# Patient Record
Sex: MALE | Race: WHITE | NOT HISPANIC OR LATINO | Employment: FULL TIME | URBAN - METROPOLITAN AREA
[De-identification: names, ages, dates, MRNs, and addresses within clinical notes are randomized per-mention and may not be internally consistent; named-entity substitution may affect disease eponyms.]

---

## 2017-01-11 ENCOUNTER — ALLSCRIPTS OFFICE VISIT (OUTPATIENT)
Dept: OTHER | Facility: OTHER | Age: 42
End: 2017-01-11

## 2017-01-11 DIAGNOSIS — R51.9 HEADACHE: ICD-10-CM

## 2017-01-11 DIAGNOSIS — M54.2 CERVICALGIA: ICD-10-CM

## 2017-01-11 DIAGNOSIS — I25.10 ATHEROSCLEROTIC HEART DISEASE OF NATIVE CORONARY ARTERY WITHOUT ANGINA PECTORIS: ICD-10-CM

## 2017-01-11 DIAGNOSIS — R68.89 OTHER GENERAL SYMPTOMS AND SIGNS: ICD-10-CM

## 2017-01-20 ENCOUNTER — HOSPITAL ENCOUNTER (OUTPATIENT)
Dept: NON INVASIVE DIAGNOSTICS | Facility: CLINIC | Age: 42
Discharge: HOME | End: 2017-01-20
Payer: COMMERCIAL

## 2017-01-20 DIAGNOSIS — R68.89 OTHER GENERAL SYMPTOMS AND SIGNS: ICD-10-CM

## 2017-01-20 DIAGNOSIS — I25.10 ATHEROSCLEROTIC HEART DISEASE OF NATIVE CORONARY ARTERY WITHOUT ANGINA PECTORIS: ICD-10-CM

## 2017-01-20 DIAGNOSIS — R51.9 HEADACHE: ICD-10-CM

## 2017-01-20 DIAGNOSIS — M54.2 CERVICALGIA: ICD-10-CM

## 2017-01-20 PROCEDURE — 93880 EXTRACRANIAL BILAT STUDY: CPT

## 2017-01-23 ENCOUNTER — GENERIC CONVERSION - ENCOUNTER (OUTPATIENT)
Dept: OTHER | Facility: OTHER | Age: 42
End: 2017-01-23

## 2017-03-02 ENCOUNTER — ALLSCRIPTS OFFICE VISIT (OUTPATIENT)
Dept: OTHER | Facility: OTHER | Age: 42
End: 2017-03-02

## 2017-07-11 ENCOUNTER — ALLSCRIPTS OFFICE VISIT (OUTPATIENT)
Dept: OTHER | Facility: OTHER | Age: 42
End: 2017-07-11

## 2017-07-20 LAB
A/G RATIO (HISTORICAL): 2.2 (CALC) (ref 1–2.5)
ALBUMIN SERPL BCP-MCNC: 4.8 G/DL (ref 3.6–5.1)
ALP SERPL-CCNC: 65 U/L (ref 40–115)
ALT SERPL W P-5'-P-CCNC: 33 U/L (ref 9–46)
AST SERPL W P-5'-P-CCNC: 27 U/L (ref 10–40)
BILIRUB SERPL-MCNC: 0.9 MG/DL (ref 0.2–1.2)
BUN SERPL-MCNC: 17 MG/DL (ref 7–25)
BUN/CREA RATIO (HISTORICAL): NORMAL (CALC) (ref 6–22)
CALCIUM SERPL-MCNC: 10.1 MG/DL (ref 8.6–10.3)
CHLORIDE SERPL-SCNC: 102 MMOL/L (ref 98–110)
CHOLEST SERPL-MCNC: 209 MG/DL (ref 125–200)
CHOLEST/HDLC SERPL: 3.7 (CALC)
CO2 SERPL-SCNC: 27 MMOL/L (ref 20–31)
CREAT SERPL-MCNC: 0.98 MG/DL (ref 0.6–1.35)
EGFR AFRICAN AMERICAN (HISTORICAL): 112 ML/MIN/1.73M2
EGFR-AMERICAN CALC (HISTORICAL): 97 ML/MIN/1.73M2
GAMMA GLOBULIN (HISTORICAL): 2.2 G/DL (CALC) (ref 1.9–3.7)
GLUCOSE (HISTORICAL): 94 MG/DL (ref 65–99)
HDLC SERPL-MCNC: 56 MG/DL
LDL CHOLESTEROL (HISTORICAL): 135 MG/DL (CALC)
NON-HDL-CHOL (CHOL-HDL) (HISTORICAL): 153 MG/DL (CALC)
POTASSIUM SERPL-SCNC: 5 MMOL/L (ref 3.5–5.3)
SODIUM SERPL-SCNC: 137 MMOL/L (ref 135–146)
TOTAL PROTEIN (HISTORICAL): 7 G/DL (ref 6.1–8.1)
TRIGL SERPL-MCNC: 89 MG/DL

## 2017-07-31 ENCOUNTER — GENERIC CONVERSION - ENCOUNTER (OUTPATIENT)
Dept: OTHER | Facility: OTHER | Age: 42
End: 2017-07-31

## 2018-01-09 ENCOUNTER — ALLSCRIPTS OFFICE VISIT (OUTPATIENT)
Dept: OTHER | Facility: OTHER | Age: 43
End: 2018-01-09

## 2018-01-10 NOTE — PROGRESS NOTES
Assessment   1  Benign essential hypertension (401 1) (I10)   2  Pruritus (698 9) (L29 9)    Plan   Health Maintenance    · Follow-up visit in 6 months Evaluation and Treatment  Follow-up  Status: Hold For -    Scheduling  Requested for: [id number]  Pruritus    · Triamcinolone Acetonide 0 5 % External Cream; APPLY 2-3 TIMES DAILY TO    AFFECTED AREA(S)    Discussion/Summary      Hypertension appears to be well controlled on lisinopril 10 mg daily--continue current treatment  Patient complains of pruritus, unknown cause--recommend empiric treatment with high potency triamcinolone cream  Encouraged patient to follow up with Dermatology if this does not help  If Dermatology does not feel this is a dermatologic matter, will refer to PM&R for nerve conduction study  Patient verbalizes understanding and agrees to plan of care  RTO 6 months for complete physical exam and blood work  The patient was counseled regarding diagnostic results,-- instructions for management,-- risk factor reductions,-- prognosis,-- patient and family education,-- impressions,-- risks and benefits of treatment options,-- importance of compliance with treatment  Possible side effects of new medications were reviewed with the patient/guardian today  The treatment plan was reviewed with the patient/guardian   The patient/guardian understands and agrees with the treatment plan      Chief Complaint   Pt here for 6 months f/u      History of Present Illness   Patient presents for routine blood pressure follow-up reports itching of left upper extremity has been occurring off and on for approximately 5 years, worse the last several weeks that he is typically gotten the symptoms every fall in the resolve rather quickly year, his symptoms began in the fall and are lingering a generalized, intermittent itch affecting the left upper extremity associated with visible hives recommended over-the-counter anti-itch cream that did not help, he is concerned that he may be suffering from a condition called âbrachioradial pruritusâ    The patient presents for follow-up of essential hypertension  The patient states he has been doing well with his blood pressure control since the last visit  He has no significant interval events  Symptoms: denies impaired vision,-- denies dyspnea,-- denies chest pain,-- denies intermittent leg claudication-- and-- denies lower extremity edema  Associated symptoms include no headache,-- no focal neurologic deficits-- and-- no memory loss  Home monitoring: The patient checks his blood pressure sporadically  Blood pressure control has been good  Medications: the patient is adherent with his medication regimen  -- He denies medication side effects  Additional History: Believes he has gotten lax with his diet  Review of Systems        Constitutional: No fever or chills, feels well, no tiredness, no recent weight gain or weight loss  Eyes: No complaints of eye pain, no red eyes, no discharge from eyes, no itchy eyes  ENT: no complaints of earache, no hearing loss, no nosebleeds, no nasal discharge, no sore throat, no hoarseness  Cardiovascular: No complaints of slow heart rate, no fast heart rate, no chest pain, no palpitations, no leg claudication, no lower extremity  Respiratory: No complaints of shortness of breath, no wheezing, no cough, no SOB on exertion, no orthopnea or PND  Gastrointestinal: No complaints of abdominal pain, no constipation, no nausea or vomiting, no diarrhea or bloody stools  Genitourinary: No complaints of dysuria, no incontinence, no hesitancy, no nocturia, no genital lesion, no testicular pain  Musculoskeletal: No complaints of arthralgia, no myalgias, no joint swelling or stiffness, no limb pain or swelling  Integumentary: as noted in HPI        Neurological: No compliants of headache, no confusion, no convulsions, no numbness or tingling, no dizziness or fainting, no limb weakness, no difficulty walking  Psychiatric: Is not suicidal, no sleep disturbances, no anxiety or depression, no change in personality, no emotional problems  Endocrine: No complaints of proptosis, no hot flashes, no muscle weakness, no erectile dysfunction, no deepening of the voice, no feelings of weakness  Hematologic/Lymphatic: No complaints of swollen glands, no swollen glands in the neck, does not bleed easily, no easy bruising  Active Problems   1  Benign essential hypertension (401 1) (I10)   2  Generalized headaches (784 0) (R51)   3  Other cardiovascular disease (429 2) (I25 10)   4  Prolapsing Mitral Valve Leaflet Syndrome (424 0)    Past Medical History   1  History of Anxiety (300 00) (F41 9)   2  History of Colon, diverticulosis (562 10) (K57 30)   3  History of Epidermal inclusion cyst (706 2) (L72 0)   4  History of fatigue (V13 89) (Z87 898)   5  History of onychomycosis (V12 09) (Z86 19)   6  History of vertigo (V12 49) (Z87 898)   7  History of Lyme disease (088 81) (A69 20)   8  History of Non-toxic multinodular goiter (241 1) (E04 2)   9  History of Rhinitis Medicamentosa (472 0)     The active problems and past medical history were reviewed and updated today  Surgical History   1  History of Colonoscopy (Fiberoptic)   2  History of Percutaneous Fixation Of Fracture Of One Metacarpal Bone   3  History of Septoplasty     The surgical history was reviewed and updated today  Family History   Mother    1  No pertinent family history  Father    2  No pertinent family history     The family history was reviewed and updated today  Social History    · Denied: History of Alcohol Use (History)   · Educational Level - Completed Associate's Degree   · Former smoker (V15 82) (E31 173)   · Marital History - Currently    · Occupation:   · Social alcohol use (Z78 9)  The social history was reviewed and updated today   The social history was reviewed and is unchanged  Current Meds    1  Aleve TABS; Therapy: (Recorded:11Jan2017) to Recorded   2  Fluorouracil (Topical) 2 % SOLN;     Therapy: (Recorded:09Jan2018) to Recorded   3  Lisinopril 10 MG Oral Tablet; take 1 tablet by mouth daily; Therapy: 46YCS5790 to (Jayda Holbrook)  Requested for: 32QJD9409; Last     Rx:30Nov2017 Ordered   4  Multiple Vitamins Oral Tablet; TAKE 1 TABLET DAILY; Therapy: 38KDA1138 to (Evaluate:62Xtb9806) Recorded     The medication list was reviewed and updated today  Allergies   1  Penicillins    Vitals   Vital Signs    Recorded: 49AHZ7245 05:39PM   Temperature 97 5 F, Tympanic   Heart Rate 59   Systolic 975   Diastolic 74   Weight 268 lb 3 oz   BMI Calculated 26 28   BSA Calculated 2 15   O2 Saturation 98     Physical Exam        Constitutional      General appearance: No acute distress, well appearing and well nourished  Eyes      Conjunctiva and lids: No swelling, erythema, or discharge  Ears, Nose, Mouth, and Throat      External inspection of ears and nose: Normal        Oropharynx: Normal with no erythema, edema, exudate or lesions  Pulmonary      Respiratory effort: No increased work of breathing or signs of respiratory distress  Auscultation of lungs: Clear to auscultation, equal breath sounds bilaterally, no wheezes, no rales, no rhonci  Cardiovascular      Auscultation of heart: Normal rate and rhythm, normal S1 and S2, without murmurs  Examination of extremities for edema and/or varicosities: Normal        Abdomen      Abdomen: Non-tender, no masses  Lymphatic      Palpation of lymph nodes in neck: No lymphadenopathy  Musculoskeletal      Gait and station: Normal        Skin      Skin and subcutaneous tissue: Normal without rashes or lesions  -- No urticaria, erythema, or excoriations appreciated on exam today  Neurologic      Cranial nerves: Cranial nerves 2-12 intact         Psychiatric Orientation to person, place and time: Normal        Mood and affect: Normal           Future Appointments      Date/Time Provider Specialty Site   07/10/2018 04:00 PM Angie Chong DO Family Medicine [facility]     Signatures    Electronically signed by : Vivian Woody DO; Jan 9 2018  6:15PM EST                       (Author)

## 2018-01-12 VITALS
WEIGHT: 196.44 LBS | DIASTOLIC BLOOD PRESSURE: 80 MMHG | BODY MASS INDEX: 26.03 KG/M2 | OXYGEN SATURATION: 98 % | SYSTOLIC BLOOD PRESSURE: 128 MMHG | HEART RATE: 68 BPM | HEIGHT: 73 IN | TEMPERATURE: 98.2 F

## 2018-01-12 VITALS
SYSTOLIC BLOOD PRESSURE: 134 MMHG | BODY MASS INDEX: 28.04 KG/M2 | HEART RATE: 67 BPM | HEIGHT: 72 IN | TEMPERATURE: 97.8 F | OXYGEN SATURATION: 99 % | RESPIRATION RATE: 18 BRPM | DIASTOLIC BLOOD PRESSURE: 84 MMHG | WEIGHT: 207 LBS

## 2018-01-12 NOTE — PROGRESS NOTES
Assessment    1  Encounter for preventive health examination (V70 0) (Z00 00)   2  Benign essential hypertension (401 1) (I10)   3  Screening for lipid disorders (V77 91) (Z13 220)   4  Screening for diabetes mellitus (V77 1) (Z13 1)    Plan  Benign essential hypertension, Health Maintenance, Screening for diabetes mellitus,  Screening for lipid disorders    · (1) COMPREHENSIVE METABOLIC PANEL; Status:Active; Requested for:71Bxc0635;    · (1) LIPID PANEL, FASTING; Status:Active; Requested for:23Tuw6052;     Discussion/Summary  Impression: health maintenance visit, healthy adult male  Currently, he eats an adequate diet and has an adequate exercise regimen  Prostate cancer screening: PSA is not indicated  Testicular cancer screening: the risks and benefits of testicular cancer screening were discussed and monthly self testicular exam was advised  Colorectal cancer screening: colorectal cancer screening is not indicated  Screening lab work includes glucose and lipid profile  The immunizations are up to date  He was advised to be evaluated by an optometrist and a dentist  Advice and education were given regarding nutrition, aerobic exercise, weight bearing exercise, weight loss, calcium supplements, vitamin D supplements, reproductive health, cardiovascular risk reduction, sunscreen use, self skin examination and seat belt use  Patient discussion: discussed with the patient  The patient was counseled regarding diagnostic results, instructions for management, risk factor reductions, prognosis, patient and family education, impressions, risks and benefits of treatment options, importance of compliance with treatment  Possible side effects of new medications were reviewed with the patient/guardian today  The treatment plan was reviewed with the patient/guardian   The patient/guardian understands and agrees with the treatment plan      Chief Complaint  Pt here for annual physical exam      History of Present Illness  HM, Adult Male: The patient is being seen for a health maintenance evaluation  The last health maintenance visit was 2 year(s) ago  Social History: Household members include spouse and 1 son(s)  He is   Work status: working full time  The patient is a former cigarette smoker and quit smoking 2009  He has 15 pack year(s) of cigarette use  He reports occasional alcohol use and drinking 2-3 drinks per week  He has never used illicit drugs  General Health: The patient's health since the last visit is described as good  He has regular dental visits  He complains of vision problems  Vision care includes wearing glasses (night driving) and having regular eye examinations  He denies hearing loss  Immunizations status: up to date  Lifestyle:  He consumes a diverse and healthy diet  (healthy, diverse -- protein from lean meat/fish -- working on portion control )   He exercises regularly  He exercises less than three times a week for 30 or more minutes per session  Exercise includes walking and running/jogging  Reproductive health:  the patient is sexually active  birth control is being practiced (partner's menstrual timing methods and natural family planning )   He denies erectile dysfunction  Screening: cancer screening reviewed and current  Prostate cancer screening includes no previous prostate-specific antigen testing  Testicular cancer screening includes irregular self testicular examinations  Colorectal cancer screening includes no previous screening  metabolic screening reviewed and current  risk screening reviewed and current  Cardiovascular risk factors: hypertension, but no diabetes  Safety elements used: seat belt, safe driving habits and sunscreen  Review of Systems    Constitutional: No fever or chills, feels well, no tiredness, no recent weight gain or weight loss  Eyes: No complaints of eye pain, no red eyes, no discharge from eyes, no itchy eyes     ENT: no complaints of earache, no hearing loss, no nosebleeds, no nasal discharge, no sore throat, no hoarseness  Cardiovascular: No complaints of slow heart rate, no fast heart rate, no chest pain, no palpitations, no leg claudication, no lower extremity  Respiratory: No complaints of shortness of breath, no wheezing, no cough, no SOB on exertion, no orthopnea or PND  Gastrointestinal: No complaints of abdominal pain, no constipation, no nausea or vomiting, no diarrhea or bloody stools  Genitourinary: No complaints of dysuria, no incontinence, no hesitancy, no nocturia, no genital lesion, no testicular pain  Musculoskeletal: No complaints of arthralgia, no myalgias, no joint swelling or stiffness, no limb pain or swelling  Integumentary: No complaints of skin rash or skin lesions, no itching, no skin wound, no dry skin  Neurological: No compliants of headache, no confusion, no convulsions, no numbness or tingling, no dizziness or fainting, no limb weakness, no difficulty walking  Psychiatric: Is not suicidal, no sleep disturbances, no anxiety or depression, no change in personality, no emotional problems  Endocrine: No complaints of proptosis, no hot flashes, no muscle weakness, no erectile dysfunction, no deepening of the voice, no feelings of weakness  Hematologic/Lymphatic: No complaints of swollen glands, no swollen glands in the neck, does not bleed easily, no easy bruising  Active Problems    1  Benign essential hypertension (401 1) (I10)   2  Colon, diverticulosis (562 10) (K57 30)   3  Depression screen (V79 0) (Z13 89)   4  Discomfort of neck (723 1) (M54 2)   5  Fatigue (780 79) (R53 83)   6  Generalized headaches (784 0) (R51)   7  Laceration of thumb, left (883 0) (S61 012A)   8  Other cardiovascular disease (429 2) (I25 10)   9  Prolapsing Mitral Valve Leaflet Syndrome (424 0)   10   Throat tightness (784 99) (R68 89)    Past Medical History    · History of Anxiety (300 00) (F41 9)   · History of Epidermal inclusion cyst (706 2) (L72 0)   · History of onychomycosis (V12 09) (Z86 19)   · History of vertigo (V12 49) (Z87 898)   · Laceration of thumb, left (883 0) (S61 012A)   · History of Lyme disease (088 81) (A69 20)   · History of Non-toxic multinodular goiter (241 1) (E04 2)   · History of Rhinitis Medicamentosa (472 0)    Surgical History    · History of Colonoscopy (Fiberoptic)   · History of Percutaneous Fixation Of Fracture Of One Metacarpal Bone   · History of Septoplasty    Family History  Mother    · No pertinent family history  Father    · No pertinent family history    Social History    · Denied: History of Alcohol Use (History)   · Educational Level - Completed Associate's Degree   · Former smoker (V15 82) (Z87 891)   · 1 ppd x 9 years; quit 2008   · Marital History - Currently    · 1 son   · 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  · Social alcohol use (Z78 9)    Current Meds   1  Aleve TABS; Therapy: (Recorded:11Jan2017) to Recorded   2  Lisinopril 10 MG Oral Tablet; Take 1 tablet daily; Therapy: 94DRC4832 to (Evaluate:35Ror5642)  Requested for: 73VPE7364; Last   Rx:07Jun2017; Status: ACTIVE - Renewal Denied Ordered   3  Multiple Vitamins Oral Tablet; TAKE 1 TABLET DAILY; Therapy: 62MRX2916 to (Evaluate:42Waz1992) Recorded    Allergies    1  Penicillins    Vitals   Recorded: 03HWJ0960 04:43PM   Temperature 98 2 F, Temporal   Heart Rate 68   Systolic 258   Diastolic 80   Height 6 ft 1 in   Weight 196 lb 7 oz   BMI Calculated 25 92   BSA Calculated 2 14   O2 Saturation 98     Physical Exam    Constitutional   General appearance: No acute distress, well appearing and well nourished  Head and Face   Head and face: Normal     Palpation of the face and sinuses: No sinus tenderness  Eyes   Conjunctiva and lids: No erythema, swelling or discharge  Pupils and irises: Equal, round, reactive to light      Ears, Nose, Mouth, and Throat   External inspection of ears and nose: Normal     Otoscopic examination: Tympanic membranes translucent with normal light reflex  Canals patent without erythema  Hearing: Normal     Nasal mucosa, septum, and turbinates: Normal without edema or erythema  Lips, teeth, and gums: Normal, good dentition  Oropharynx: Normal with no erythema, edema, exudate or lesions  Neck   Neck: Supple, symmetric, trachea midline, no masses  Thyroid: Normal, no thyromegaly  Pulmonary   Respiratory effort: No increased work of breathing or signs of respiratory distress  Auscultation of lungs: Clear to auscultation  Cardiovascular   Auscultation of heart: Normal rate and rhythm, normal S1 and S2, no murmurs  Examination of extremities for edema and/or varicosities: Normal     Abdomen   Abdomen: Non-tender, no masses  Liver and spleen: No hepatomegaly or splenomegaly  Lymphatic   Palpation of lymph nodes in neck: No lymphadenopathy  Musculoskeletal   Gait and station: Normal     Inspection/palpation of digits and nails: Normal without clubbing or cyanosis  Inspection/palpation of joints, bones, and muscles: Normal     Range of motion: Normal     Stability: Normal     Muscle strength/tone: Normal     Skin   Skin and subcutaneous tissue: Normal without rashes or lesions  Palpation of skin and subcutaneous tissue: Normal turgor  Neurologic   Cranial nerves: Cranial nerves 2-12 intact  Reflexes: 2+ and symmetric  Sensation: No sensory loss      Psychiatric   Orientation to person, place and time: Normal     Mood and affect: Normal        Future Appointments    Date/Time Provider Specialty Site   01/09/2018 05:45 PM Symone Mari DO Family Medicine [facility]     Signatures   Electronically signed by : Edith Gallego DO; Jul 11 2017  5:16PM EST                       (Author)

## 2018-01-13 NOTE — RESULT NOTES
Verified Results  (1) COMPREHENSIVE METABOLIC PANEL [id number] 83:04ZY Clare Barrera   REPORT COMMENT:  FASTING:YES     Test Name Result Flag Reference   GLUCOSE 94 mg/dL  65-99   Fasting reference interval   UREA NITROGEN (BUN) 17 mg/dL  7-25   CREATININE 0 98 mg/dL  0 60-1 35   eGFR NON-AFR  AMERICAN 97 mL/min/1 73m2  > OR = 60   eGFR AFRICAN AMERICAN 112 mL/min/1 73m2  > OR = 60   BUN/CREATININE RATIO   3-45   NOT APPLICABLE (calc)   SODIUM 137 mmol/L  135-146   POTASSIUM 5 0 mmol/L  3 5-5 3   CHLORIDE 102 mmol/L     CARBON DIOXIDE 27 mmol/L  20-31   CALCIUM 10 1 mg/dL  8 6-10 3   PROTEIN, TOTAL 7 0 g/dL  6 1-8 1   ALBUMIN 4 8 g/dL  3 6-5 1   GLOBULIN 2 2 g/dL (calc)  1 9-3 7   ALBUMIN/GLOBULIN RATIO 2 2 (calc)  1 0-2 5   BILIRUBIN, TOTAL 0 9 mg/dL  0 2-1 2   ALKALINE PHOSPHATASE 65 U/L     AST 27 U/L  10-40   ALT 33 U/L  9-46     (1) LIPID PANEL, FASTING [id number] 02:43PM Clare Barrera     Test Name Result Flag Reference   CHOLESTEROL, TOTAL 209 mg/dL H 125-200   HDL CHOLESTEROL 56 mg/dL  > OR = 40   TRIGLICERIDES 89 mg/dL  <421   LDL-CHOLESTEROL 135 mg/dL (calc) H <130   Desirable range <100 mg/dL for patients with CHD or  diabetes and <70 mg/dL for diabetic patients with  known heart disease  CHOL/HDLC RATIO 3 7 (calc)  < OR = 5 0   NON HDL CHOLESTEROL 153 mg/dL (calc)     Target for non-HDL cholesterol is 30 mg/dL higher than   LDL cholesterol target

## 2018-01-14 VITALS
OXYGEN SATURATION: 98 % | SYSTOLIC BLOOD PRESSURE: 132 MMHG | BODY MASS INDEX: 27.7 KG/M2 | DIASTOLIC BLOOD PRESSURE: 80 MMHG | WEIGHT: 204.25 LBS | HEART RATE: 62 BPM | TEMPERATURE: 98.7 F

## 2018-01-15 NOTE — RESULT NOTES
Verified Results  (Q) CBC (INCLUDES DIFF/PLT) WITH SMEAR REVIEW [id number] 07:08AM María Pauline     Test Name Result Flag Reference   WHITE BLOOD CELL COUNT 6 7 Thousand/uL  3 8-10 8   RED BLOOD CELL COUNT 4 40 Million/uL  4 20-5 80   HEMOGLOBIN 13 1 g/dL L 13 2-17 1   HEMATOCRIT 40 6 %  38 5-50 0   MCV 92 4 fL  80 0-100 0   MCH 29 8 pg  27 0-33 0   MCHC 32 2 g/dL  32 0-36 0   RDW 14 7 %  11 0-15 0   PLATELET COUNT 376 Thousand/uL  140-400   MPV 7 8 fL  7 5-11 5   ABSOLUTE NEUTROPHILS 3618 cells/uL  7859-3217   ABSOLUTE LYMPHOCYTES 2144 cells/uL  850-3900   ABSOLUTE MONOCYTES 402 cells/uL  200-950   ABSOLUTE EOSINOPHILS 536 cells/uL H    ABSOLUTE BASOPHILS 0 cells/uL  0-200   NEUTROPHILS 54 0 %     LYMPHOCYTES 32 0 %     MONOCYTES 6 0 %     EOSINOPHILS 8 0 %     BASOPHILS 0 %       (1) COMPREHENSIVE METABOLIC PANEL [id number] 34:97YZ María Pauline     Test Name Result Flag Reference   GLUCOSE 90 mg/dL  65-99   Fasting reference interval   UREA NITROGEN (BUN) 19 mg/dL  7-25   CREATININE 0 98 mg/dL  0 60-1 35   eGFR NON-AFR   AMERICAN 97 mL/min/1 73m2  > OR = 60   eGFR AFRICAN AMERICAN 113 mL/min/1 73m2  > OR = 60   BUN/CREATININE RATIO   6-84   NOT APPLICABLE (calc)   SODIUM 137 mmol/L  135-146   POTASSIUM 4 2 mmol/L  3 5-5 3   CHLORIDE 103 mmol/L     CARBON DIOXIDE 26 mmol/L  19-30   CALCIUM 9 5 mg/dL  8 6-10 3   PROTEIN, TOTAL 7 1 g/dL  6 1-8 1   ALBUMIN 4 7 g/dL  3 6-5 1   GLOBULIN 2 4 g/dL (calc)  1 9-3 7   ALBUMIN/GLOBULIN RATIO 2 0 (calc)  1 0-2 5   BILIRUBIN, TOTAL 0 6 mg/dL  0 2-1 2   ALKALINE PHOSPHATASE 64 U/L     AST 27 U/L  10-40   ALT 39 U/L  9-46     (Q) LIPID PANEL WITH REFLEX TO DIRECT LDL [id number] 07:08AM María Pauline     Test Name Result Flag Reference   CHOLESTEROL, TOTAL 236 mg/dL H 125-200   HDL CHOLESTEROL 57 mg/dL  > OR = 40   TRIGLICERIDES 277 mg/dL  <150   LDL-CHOLESTEROL 154 mg/dL (calc) H <130   Desirable range <100 mg/dL for patients with CHD or  diabetes and <70 mg/dL for diabetic patients with  known heart disease  CHOL/HDLC RATIO 4 1 (calc)  < OR = 5 0   NON HDL CHOLESTEROL 179 mg/dL (calc) H    Target for non-HDL cholesterol is 30 mg/dL higher than   LDL cholesterol target  (Q) URINALYSIS REFLEX [id number] 07:08AM Sylvia Oreilly   REPORT COMMENT:  FASTING:YES     Test Name Result Flag Reference   COLOR YELLOW  YELLOW   APPEARANCE TURBID A CLEAR   SPECIFIC GRAVITY 1 015  1 001-1 035   PH 7 0  5 0-8 0   GLUCOSE NEGATIVE  NEGATIVE   BILIRUBIN NEGATIVE  NEGATIVE   KETONES NEGATIVE  NEGATIVE   OCCULT BLOOD NEGATIVE  NEGATIVE   PROTEIN NEGATIVE  NEGATIVE   NITRITE NEGATIVE  NEGATIVE   LEUKOCYTE ESTERASE NEGATIVE  NEGATIVE       Discussion/Summary   Your cholesterol seems to have made a bit of a jump (along with your weight) over the last several years  Enclosed is a cholesterol info sheet  Other labs looked OK  Call if questions   Dr Cyndie Billings

## 2018-01-16 NOTE — RESULT NOTES
Verified Results  VAS CAROTID COMPLETE STUDY 20Jan2017 02:20PM Emigdio Ahumada Order Number: [id number]    - Patient Instructions: To schedule this appointment, please contact Central Scheduling at [phone]  Test Name Result Flag Reference   VAS CAROTID COMPLETE STUDY (Report)     [facility] REPORT   CLINICAL:   Indications: Carotid disease w/o CVA [I65 29]  Patient complains of pressure in his neck and head  Evaluate for carotid artery disease  Risk Factors   The patient has history of HTN  Clinical   Right Brachial Pressure: 118/70 mm Hg, Left Brachial Pressure: 120/68 mmHg  FINDINGS:      Right    Impression PSV EDV (cm/s) Direction of Flow Ratio    Dist  ICA        112     19           0 92    Mid  ICA         89     38           0 74    Prox  ICA  1 - 49%   114     29           0 94    Dist CCA         129     32                 Mid CCA         121     30           0 97    Prox CCA         124     24                 Ext Carotid       145     25           1 20    Prox Vert         63     19 Antegrade            Subclavian        198      0                    Left     Impression PSV EDV (cm/s) Direction of Flow Ratio    Dist  ICA        104     41           0 98    Mid  ICA         106     46           1 00    Prox  ICA  1 - 49%   114     23           1 07    Dist CCA         122     38                 Mid CCA         106     28           0 92    Prox CCA         116     30                 Ext Carotid       151     25           1 43    Prox Vert         61     18 Antegrade            Subclavian        176     18                          CONCLUSION:   Impression   RIGHT:   There is minimal <50% stenosis noted in the internal carotid artery  Plaque is   homogenous and smooth  Vertebral artery flow is antegrade  There is no significant subclavian artery   disease  LEFT:   There is minimal <50% stenosis noted in the internal carotid artery   Plaque is   homogenous and smooth  Vertebral artery flow is antegrade  There is no significant subclavian artery   disease  Internal carotid artery stenosis determination by consensus criteria from:   Kenyetta Moody , et al  Carotid Artery Stenosis: Gray-Scale and Doppler US Diagnosis   - [employer] in [address] [place] [address], Radiology 2003;   [phone]        SIGNATURE:   Electronically Signed by: Myrtle Presley MD, [address] on 2017-01-20 09:23:29 PM

## 2018-01-23 VITALS
WEIGHT: 199.19 LBS | OXYGEN SATURATION: 98 % | SYSTOLIC BLOOD PRESSURE: 130 MMHG | DIASTOLIC BLOOD PRESSURE: 74 MMHG | TEMPERATURE: 97.5 F | HEART RATE: 59 BPM | BODY MASS INDEX: 26.28 KG/M2

## 2018-05-30 DIAGNOSIS — I10 ESSENTIAL HYPERTENSION: Primary | ICD-10-CM

## 2018-05-30 RX ORDER — LISINOPRIL 10 MG/1
TABLET ORAL
Qty: 90 TABLET | Refills: 0 | Status: SHIPPED | OUTPATIENT
Start: 2018-05-30 | End: 2018-08-20 | Stop reason: SDUPTHER

## 2018-07-10 ENCOUNTER — OFFICE VISIT (OUTPATIENT)
Dept: FAMILY MEDICINE CLINIC | Facility: OTHER | Age: 43
End: 2018-07-10
Payer: COMMERCIAL

## 2018-07-10 VITALS
BODY MASS INDEX: 26.01 KG/M2 | TEMPERATURE: 98 F | HEART RATE: 60 BPM | DIASTOLIC BLOOD PRESSURE: 76 MMHG | HEIGHT: 73 IN | SYSTOLIC BLOOD PRESSURE: 124 MMHG | OXYGEN SATURATION: 96 % | WEIGHT: 196.25 LBS

## 2018-07-10 DIAGNOSIS — I10 BENIGN ESSENTIAL HYPERTENSION: ICD-10-CM

## 2018-07-10 DIAGNOSIS — Z00.00 WELL ADULT EXAM: Primary | ICD-10-CM

## 2018-07-10 PROBLEM — L29.9 PRURITUS: Status: ACTIVE | Noted: 2018-01-09

## 2018-07-10 PROBLEM — S61.012A LACERATION OF THUMB, LEFT: Status: RESOLVED | Noted: 2017-03-02 | Resolved: 2018-07-10

## 2018-07-10 PROBLEM — I25.10 CARDIOVASCULAR DISEASE: Status: ACTIVE | Noted: 2017-01-11

## 2018-07-10 PROBLEM — R51.9 GENERALIZED HEADACHES: Status: ACTIVE | Noted: 2017-01-11

## 2018-07-10 PROBLEM — S61.012A LACERATION OF THUMB, LEFT: Status: ACTIVE | Noted: 2017-03-02

## 2018-07-10 PROCEDURE — 99396 PREV VISIT EST AGE 40-64: CPT | Performed by: FAMILY MEDICINE

## 2018-07-10 RX ORDER — NAPROXEN SODIUM 220 MG
TABLET ORAL
COMMUNITY

## 2018-07-10 RX ORDER — MULTIVITAMIN WITH IRON
1 TABLET ORAL DAILY
COMMUNITY
Start: 2013-07-24

## 2018-07-10 NOTE — PROGRESS NOTES
Subjective:      Patient ID: Karina Roberts is a 38 y o  male  HPI   Pt presents for annual physical/wellness exam  Overall health good  Regular eye/dental exams  Diet: healthy, diverse -- protein from lean meat/fish   Exercise: 2-3x per wk cardio  Colonoscopy: about 2013  Last PSA: n/a  Vaccines: Tdap 2015        The following portions of the patient's history were reviewed and updated as appropriate: allergies, current medications, past family history, past medical history, past social history, past surgical history and problem list       Current Outpatient Prescriptions:     lisinopril (ZESTRIL) 10 mg tablet, TAKE 1 TABLET BY MOUTH DAILY, Disp: 90 tablet, Rfl: 0    Multiple Vitamins tablet, Take 1 tablet by mouth daily, Disp: , Rfl:     naproxen sodium (ALEVE) 220 MG tablet, Take by mouth, Disp: , Rfl:       Review of Systems   Constitutional: Negative for appetite change, fatigue, fever and unexpected weight change  HENT: Negative for congestion, dental problem, ear pain, postnasal drip, sore throat and tinnitus  Eyes: Negative for pain, discharge and visual disturbance  Respiratory: Negative for cough, shortness of breath and wheezing  Cardiovascular: Negative for chest pain, palpitations and leg swelling  Gastrointestinal: Negative for abdominal pain, constipation, diarrhea, nausea and vomiting  Endocrine: Negative for cold intolerance and heat intolerance  Genitourinary: Negative for difficulty urinating, dysuria, flank pain and urgency  Musculoskeletal: Negative for arthralgias, back pain, joint swelling and myalgias  Skin: Negative for rash and wound  Allergic/Immunologic: Negative for immunocompromised state  Neurological: Negative for dizziness, syncope, speech difficulty, weakness and numbness  Hematological: Negative for adenopathy  Does not bruise/bleed easily  Psychiatric/Behavioral: Negative for confusion, dysphoric mood and sleep disturbance   The patient is not nervous/anxious  Objective:      /76 (BP Location: Left arm, Patient Position: Sitting, Cuff Size: Adult)   Pulse 60   Temp 98 °F (36 7 °C) (Tympanic)   Ht 6' 0 5" (1 842 m)   Wt 89 kg (196 lb 4 oz)   SpO2 96%   BMI 26 25 kg/m²          Physical Exam   Constitutional: He is oriented to person, place, and time  He appears well-developed and well-nourished  No distress  HENT:   Head: Normocephalic and atraumatic  Right Ear: External ear normal    Left Ear: External ear normal    Nose: Nose normal    Mouth/Throat: Oropharynx is clear and moist  No oropharyngeal exudate  Eyes: Conjunctivae and EOM are normal  Pupils are equal, round, and reactive to light  No scleral icterus  Neck: Normal range of motion  Neck supple  No thyromegaly present  Cardiovascular: Normal rate, regular rhythm and normal heart sounds  No murmur heard  Pulmonary/Chest: Effort normal and breath sounds normal  No respiratory distress  He has no wheezes  He has no rales  Abdominal: Soft  Bowel sounds are normal  He exhibits no mass  There is no tenderness  Musculoskeletal: Normal range of motion  He exhibits no edema or tenderness  Lymphadenopathy:     He has no cervical adenopathy  Neurological: He is alert and oriented to person, place, and time  He has normal reflexes  No cranial nerve deficit  Skin: Skin is warm and dry  No rash noted  No erythema  Psychiatric: He has a normal mood and affect  His behavior is normal    Vitals reviewed  Assessment/Plan:   Diagnoses and all orders for this visit:    Well adult exam        -     Body mass index is 26 25 kg/m²  -     Reviewed the importance of healthy diet, regular exercise, and eye/dental exams   -     Basic metabolic panel; Future    Benign essential hypertension  -     Basic metabolic panel; Future  -     Stable, goal <140/90 --- continue lisinopril     Other orders  -     Multiple Vitamins tablet;  Take 1 tablet by mouth daily  - naproxen sodium (ALEVE) 220 MG tablet; Take by mouth          RTO 6 months for BP recheck  The patient indicates understanding of these issues and agrees with the plan        Winston Khan DO

## 2018-07-26 LAB
BUN SERPL-MCNC: 16 MG/DL (ref 7–25)
BUN/CREAT SERPL: NORMAL (CALC) (ref 6–22)
CALCIUM SERPL-MCNC: 9.7 MG/DL (ref 8.6–10.3)
CHLORIDE SERPL-SCNC: 102 MMOL/L (ref 98–110)
CO2 SERPL-SCNC: 29 MMOL/L (ref 20–31)
CREAT SERPL-MCNC: 1.03 MG/DL (ref 0.6–1.35)
GLUCOSE SERPL-MCNC: 92 MG/DL (ref 65–99)
POTASSIUM SERPL-SCNC: 4.4 MMOL/L (ref 3.5–5.3)
SL AMB EGFR AFRICAN AMERICAN: 105 ML/MIN/1.73M2
SL AMB EGFR NON AFRICAN AMERICAN: 90 ML/MIN/1.73M2
SODIUM SERPL-SCNC: 137 MMOL/L (ref 135–146)

## 2018-08-20 DIAGNOSIS — I10 ESSENTIAL HYPERTENSION: ICD-10-CM

## 2018-08-20 RX ORDER — LISINOPRIL 10 MG/1
TABLET ORAL
Qty: 90 TABLET | Refills: 0 | Status: SHIPPED | OUTPATIENT
Start: 2018-08-20 | End: 2018-08-27 | Stop reason: SDUPTHER

## 2018-08-27 DIAGNOSIS — I10 ESSENTIAL HYPERTENSION: ICD-10-CM

## 2018-08-28 RX ORDER — LISINOPRIL 10 MG/1
10 TABLET ORAL DAILY
Qty: 90 TABLET | Refills: 0 | Status: SHIPPED | OUTPATIENT
Start: 2018-08-28 | End: 2019-02-25 | Stop reason: SDUPTHER

## 2019-01-14 ENCOUNTER — OFFICE VISIT (OUTPATIENT)
Dept: FAMILY MEDICINE CLINIC | Facility: OTHER | Age: 44
End: 2019-01-14
Payer: COMMERCIAL

## 2019-01-14 VITALS
DIASTOLIC BLOOD PRESSURE: 68 MMHG | BODY MASS INDEX: 26.84 KG/M2 | SYSTOLIC BLOOD PRESSURE: 100 MMHG | HEIGHT: 73 IN | HEART RATE: 66 BPM | OXYGEN SATURATION: 99 % | WEIGHT: 202.5 LBS | TEMPERATURE: 97.9 F

## 2019-01-14 DIAGNOSIS — I10 BENIGN ESSENTIAL HYPERTENSION: Primary | ICD-10-CM

## 2019-01-14 DIAGNOSIS — Z28.21 REFUSED INFLUENZA VACCINE: ICD-10-CM

## 2019-01-14 DIAGNOSIS — R41.3 MEMORY LOSS OR IMPAIRMENT: ICD-10-CM

## 2019-01-14 DIAGNOSIS — R06.81 WITNESSED EPISODE OF APNEA: ICD-10-CM

## 2019-01-14 PROCEDURE — 99214 OFFICE O/P EST MOD 30 MIN: CPT | Performed by: FAMILY MEDICINE

## 2019-01-14 PROCEDURE — 3074F SYST BP LT 130 MM HG: CPT | Performed by: FAMILY MEDICINE

## 2019-01-14 PROCEDURE — 3078F DIAST BP <80 MM HG: CPT | Performed by: FAMILY MEDICINE

## 2019-01-14 NOTE — PROGRESS NOTES
Subjective:      Patient ID: Courtney Muller is a 41 y o  male  Patient presents for hypertension follow-up and also complains of memory loss  States that he has been having an issue with his memory for a few months now, worse in the last 1-2 months  Finds that his short-term and long-term memory are both affected  He does admit to increased stressors both at home and at work, but is concerned there is more to than that  Wife states that patient stops breathing occasionally, mostly when falling asleep  He does not believe that he snores  States that he often wakes up feeling unrefreshed and has a groggy/cloudy sensation throughout the day      Hypertension   This is a chronic problem  The current episode started more than 1 year ago  The problem is controlled  Pertinent negatives include no anxiety, blurred vision, chest pain, headaches, malaise/fatigue, neck pain, orthopnea, palpitations, peripheral edema, PND, shortness of breath or sweats  There are no associated agents to hypertension  Risk factors for coronary artery disease include family history, male gender and stress  Past treatments include ACE inhibitors  The current treatment provides significant improvement  Compliance problems include exercise, diet and psychosocial issues  There is no history of angina, kidney disease, CAD/MI, CVA, heart failure, left ventricular hypertrophy, PVD or retinopathy  There is no history of chronic renal disease, a hypertension causing med or a thyroid problem  PHQ-9 Depression Screening    PHQ-9:    Frequency of the following problems over the past two weeks:       Little interest or pleasure in doing things:  0 - not at all  Feeling down, depressed, or hopeless:  0 - not at all  PHQ-2 Score:  0       MARY BETH-7 Flowsheet Screening      Most Recent Value   Over the last two weeks, how often have you been bothered by the following problems?     Feeling nervous, anxious, or on edge  1   Not being able to stop or control worrying  1   Worrying too much about different things  1   Trouble relaxing   2   Being so restless that it's hard to sit still  1   Becoming easily annoyed or irritable   3   Feeling afraid as if something awful might happen  0   How difficult have these problems made it for you to do your work, take care of things at home, or get along with other people? Somewhat difficult   MARY BETH Score   9              The following portions of the patient's history were reviewed and updated as appropriate: allergies, current medications, past family history, past medical history, past social history, past surgical history and problem list       Current Outpatient Prescriptions:     lisinopril (ZESTRIL) 10 mg tablet, Take 1 tablet (10 mg total) by mouth daily, Disp: 90 tablet, Rfl: 0    Multiple Vitamins tablet, Take 1 tablet by mouth daily, Disp: , Rfl:     naproxen sodium (ALEVE) 220 MG tablet, Take by mouth, Disp: , Rfl:       Review of Systems   Constitutional: Positive for fatigue  Negative for activity change, fever and malaise/fatigue  HENT: Negative for congestion, ear pain, sinus pain and sore throat  Eyes: Negative for blurred vision, pain, itching and visual disturbance  Respiratory: Negative for cough, chest tightness and shortness of breath  Cardiovascular: Negative for chest pain, palpitations, orthopnea, leg swelling and PND  Gastrointestinal: Negative for abdominal pain, constipation, diarrhea, nausea and vomiting  Endocrine: Negative for cold intolerance and heat intolerance  Genitourinary: Negative for difficulty urinating, dysuria, frequency and urgency  Musculoskeletal: Negative for arthralgias, back pain, myalgias and neck pain  Skin: Negative for color change and rash  Neurological: Negative for dizziness, syncope and headaches  Hematological: Negative for adenopathy  Psychiatric/Behavioral: Negative for dysphoric mood and suicidal ideas  The patient is not nervous/anxious  Memory loss per HPI         Objective:      /68 (BP Location: Right arm, Patient Position: Sitting, Cuff Size: Large)   Pulse 66   Temp 97 9 °F (36 6 °C) (Tympanic)   Ht 6' 0 5" (1 842 m)   Wt 91 9 kg (202 lb 8 oz)   SpO2 99%   BMI 27 09 kg/m²          Physical Exam   Constitutional: He is oriented to person, place, and time  He appears well-developed and well-nourished  No distress  HENT:   Head: Normocephalic and atraumatic  Mouth/Throat: Oropharynx is clear and moist    Eyes: Pupils are equal, round, and reactive to light  Conjunctivae and EOM are normal  No scleral icterus  Neck: Normal range of motion  Neck supple  Cardiovascular: Normal rate, regular rhythm and normal heart sounds  No murmur heard  Pulmonary/Chest: Effort normal and breath sounds normal  No respiratory distress  He has no wheezes  Abdominal: Soft  Bowel sounds are normal  He exhibits no distension  There is no tenderness  Musculoskeletal: Normal range of motion  He exhibits no edema, tenderness or deformity  Lymphadenopathy:     He has no cervical adenopathy  Neurological: He is alert and oriented to person, place, and time  No cranial nerve deficit  Coordination normal    Skin: Skin is warm and dry  No rash noted  No erythema  Psychiatric: He has a normal mood and affect  His behavior is normal    Vitals reviewed  Assessment/Plan:   Diagnoses and all orders for this visit:    Benign essential hypertension        -     stable, continue lisinopril 10 mg daily    Memory loss or impairment  -     Ambulatory referral to Sleep Medicine; Future    Witnessed episode of apnea  -     Ambulatory referral to Sleep Medicine; Future    Refused influenza vaccine  -     SYRINGE/SINGLE-DOSE VIAL: influenza vaccine, [id number], quadrivalent, 0 5 mL, preservative-free (AFLURIA, FLUARIX, FLULAVAL, FLUZONE)          65-year-old male presents for hypertension follow-up    Blood pressure well controlled on lisinopril 10 mg daily  Patient displaying symptoms of memory loss--possibly related to anxiety/depression verses sleep apnea  Will begin workup with sleep study to rule out obstructive sleep apnea  Follow-up to discuss mood pending results  Return in about 2 months (around 3/14/2019) for Recheck memory  The patient indicates understanding of these issues and agrees with the plan          Abhijit Emmanuel, DO

## 2019-02-21 ENCOUNTER — OFFICE VISIT (OUTPATIENT)
Dept: SLEEP CENTER | Facility: CLINIC | Age: 44
End: 2019-02-21
Payer: COMMERCIAL

## 2019-02-21 VITALS
BODY MASS INDEX: 26.77 KG/M2 | SYSTOLIC BLOOD PRESSURE: 130 MMHG | WEIGHT: 202 LBS | HEART RATE: 72 BPM | DIASTOLIC BLOOD PRESSURE: 80 MMHG | HEIGHT: 73 IN

## 2019-02-21 DIAGNOSIS — R06.83 SNORING: Primary | ICD-10-CM

## 2019-02-21 DIAGNOSIS — R41.3 MEMORY LOSS OR IMPAIRMENT: ICD-10-CM

## 2019-02-21 DIAGNOSIS — R06.81 WITNESSED EPISODE OF APNEA: ICD-10-CM

## 2019-02-21 PROCEDURE — 99244 OFF/OP CNSLTJ NEW/EST MOD 40: CPT | Performed by: INTERNAL MEDICINE

## 2019-02-21 NOTE — PROGRESS NOTES
Consultation - [id number] [facility] : 1975  MRN: [id number]      Assessment:  The patient has not been seen for three years, however he continues to experience poor memory and cloudy thinking  He also feels drowsy when he first awakens in the morning  He denies any family history of dementia and denies depression  He does have snoring, although he has no witnessed apneas  He also complains of restless legs and has been told by his wife that he moves his limbs during sleep  Plan:  Diagnostic polysomnography to evaluate for obstructive sleep apnea and periodic limb movement disorder  Follow up: After testing    History of Present Illness:   43 y o male last seen in 2016 when I suspected that his insomnia was caused by chronic nasal congestion  I referred him to ENT who was able to improved but not completely Anais LIMA is rhinitis  He also has chronic pain for which she uses Aleve  He describes the pain as losing feeling in his arm, which occurs several minutes into his bedtime  He reportedly snores, but has no witnessed apneas  He also experiences restless legs and has been told that he moves his legs at night  His sleep hygiene is unremarkable  He gets seven or 8 hours of sleep nightly  He is concerned about memory loss  He has gotten lost while driving and does not have the word recall 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   He denies depression        Review of Systems      Genitourinary none   Cardiology none   Gastrointestinal none   Neurology need to move extremities, numbness/tingling of an extremity, forgetfulness and difficulty with memory   Constitutional fatigue   Integumentary rash or dry skin and itching   Psychiatry anxiety and aggressiveness or irritability   Musculoskeletal joint pain   Pulmonary none   ENT ringing in ears   Endocrine none   Hematological none         I have reviewed and updated the review of systems as necessary    Historical Information    Past Medical History:  Hypertension, diverticulosis, arm pain, headaches    Family History: non-contributory      Sleep Schedule: unremarkable    Snoring:  Yes    Witnessed Apnea:  No    Medications/Allergies:    Current Outpatient Medications:     lisinopril (ZESTRIL) 10 mg tablet, Take 1 tablet (10 mg total) by mouth daily, Disp: 90 tablet, Rfl: 0    Multiple Vitamins tablet, Take 1 tablet by mouth daily, Disp: , Rfl:     naproxen sodium (ALEVE) 220 MG tablet, Take by mouth, Disp: , Rfl:         No notes on file                  Objective:    Vital Signs:   Vitals:    02/21/19 1400   BP: 130/80   Pulse: 72   Weight: 91 6 kg (202 lb)   Height: 6' 0 5" (1 842 m)     Neck Circumference: 40      [place] Sleepiness Scale: Total score: 8    Physical Exam:    General: Alert, appropriate, cooperative, normal build    Head: NC/AT, no retrognathia    Nose: No septal deviation, nares not obstructed, mucosa erythematous    Throat: Airway lumen narrowed, tongue base thickened, no tonsils visualized    Neck: Normal, no thyromegaly or lymphadenopathy, no JVD    Heart: RR, normal S1 and S2, no murmurs    Chest: Clear bilaterally    Extremity: No clubbing, cyanosis, no edema    Skin: Warm, dry    Neuro: No motor abnormalities, cranial nerves appear intact      Counseling / Coordination of Care  A description of the counseling / coordination of care: We discussed the differential diagnosis for his current symptoms  I will have him come back for an appointment after his sleep study        Board Certified Sleep Specialist

## 2019-02-25 DIAGNOSIS — I10 ESSENTIAL HYPERTENSION: ICD-10-CM

## 2019-02-25 RX ORDER — LISINOPRIL 10 MG/1
10 TABLET ORAL DAILY
Qty: 90 TABLET | Refills: 1 | Status: SHIPPED | OUTPATIENT
Start: 2019-02-25 | End: 2019-05-29 | Stop reason: SDUPTHER

## 2019-03-20 ENCOUNTER — OFFICE VISIT (OUTPATIENT)
Dept: FAMILY MEDICINE CLINIC | Facility: OTHER | Age: 44
End: 2019-03-20
Payer: COMMERCIAL

## 2019-03-20 VITALS
OXYGEN SATURATION: 97 % | TEMPERATURE: 96.9 F | SYSTOLIC BLOOD PRESSURE: 144 MMHG | WEIGHT: 202.06 LBS | BODY MASS INDEX: 26.78 KG/M2 | HEIGHT: 73 IN | HEART RATE: 66 BPM | DIASTOLIC BLOOD PRESSURE: 80 MMHG

## 2019-03-20 DIAGNOSIS — R06.81 WITNESSED EPISODE OF APNEA: ICD-10-CM

## 2019-03-20 DIAGNOSIS — R41.3 MEMORY LOSS OR IMPAIRMENT: ICD-10-CM

## 2019-03-20 DIAGNOSIS — I10 BENIGN ESSENTIAL HYPERTENSION: Primary | ICD-10-CM

## 2019-03-20 PROCEDURE — 99213 OFFICE O/P EST LOW 20 MIN: CPT | Performed by: FAMILY MEDICINE

## 2019-03-20 PROCEDURE — 3008F BODY MASS INDEX DOCD: CPT | Performed by: FAMILY MEDICINE

## 2019-03-20 PROCEDURE — 1036F TOBACCO NON-USER: CPT | Performed by: FAMILY MEDICINE

## 2019-03-20 NOTE — PROGRESS NOTES
Subjective:      Patient ID: Dina Brown is a 41 y o  male  Patient presents to follow-up regarding memory loss  Was last seen for this issue in mid January  Problem has been present now for about 4-5 months, unchanged  Patient reports problems with both short-term and long-term memory  He also notes increased stressors at home/work--states last week with more stressful than this week  Does see some correlation between high stress and poor memory  Concerns were raised at last visit for possible obstructive sleep apnea (snoring, unrefreshing sleep, witnessed apnea) and patient has a planned sleep study for April 2019    Hypertension   This is a chronic problem  The current episode started more than 1 year ago  The problem has been gradually improving since onset  The problem is controlled  Associated symptoms include anxiety and malaise/fatigue  Pertinent negatives include no blurred vision, chest pain, headaches, neck pain, orthopnea, palpitations, peripheral edema, PND, shortness of breath or sweats  There are no associated agents to hypertension  Risk factors for coronary artery disease include family history, male gender, obesity, stress and sedentary lifestyle  Past treatments include ACE inhibitors  The current treatment provides moderate improvement  Compliance problems include diet, exercise and psychosocial issues  There is no history of angina, kidney disease, CAD/MI, CVA, heart failure, left ventricular hypertrophy, PVD or retinopathy  There is no history of chronic renal disease, a hypertension causing med or a thyroid problem         The following portions of the patient's history were reviewed and updated as appropriate: allergies, current medications, past family history, past medical history, past social history, past surgical history and problem list       Current Outpatient Medications:     lisinopril (ZESTRIL) 10 mg tablet, Take 1 tablet (10 mg total) by mouth daily, Disp: 90 tablet, Rfl: 1    Multiple Vitamins tablet, Take 1 tablet by mouth daily, Disp: , Rfl:     naproxen sodium (ALEVE) 220 MG tablet, Take by mouth, Disp: , Rfl:       Review of Systems   Constitutional: Positive for malaise/fatigue  Negative for activity change, fatigue and fever  HENT: Negative for congestion, ear pain, sinus pain and sore throat  Eyes: Negative for blurred vision, pain, itching and visual disturbance  Respiratory: Negative for cough and shortness of breath  Cardiovascular: Negative for chest pain, palpitations, orthopnea, leg swelling and PND  Gastrointestinal: Negative for abdominal pain, constipation, diarrhea, nausea and vomiting  Endocrine: Negative for cold intolerance and heat intolerance  Genitourinary: Negative for dysuria  Musculoskeletal: Negative for myalgias and neck pain  Skin: Negative for color change and rash  Neurological: Negative for dizziness, syncope and headaches  Hematological: Negative for adenopathy  Psychiatric/Behavioral: Negative for dysphoric mood  The patient is nervous/anxious  Memory loss         Objective:      /80 (BP Location: Left arm, Patient Position: Sitting, Cuff Size: Large)   Pulse 66   Temp (!) 96 9 °F (36 1 °C) (Tympanic)   Ht 6' 0 5" (1 842 m)   Wt 91 7 kg (202 lb 1 oz)   SpO2 97%   BMI 27 03 kg/m²          Physical Exam   Constitutional: He is oriented to person, place, and time  He appears well-developed and well-nourished  No distress  HENT:   Head: Normocephalic and atraumatic  Mouth/Throat: Oropharynx is clear and moist    Eyes: Pupils are equal, round, and reactive to light  Conjunctivae and EOM are normal  No scleral icterus  Neck: Normal range of motion  Neck supple  Cardiovascular: Normal rate, regular rhythm and normal heart sounds  No murmur heard  Pulmonary/Chest: Effort normal and breath sounds normal  No respiratory distress  He has no wheezes  Abdominal: Soft   Bowel sounds are normal  He exhibits no distension  There is no tenderness  Musculoskeletal: Normal range of motion  He exhibits no edema, tenderness or deformity  Lymphadenopathy:     He has no cervical adenopathy  Neurological: He is alert and oriented to person, place, and time  No cranial nerve deficit  Coordination normal    Skin: Skin is warm and dry  No rash noted  No erythema  No pallor  Psychiatric: He has a normal mood and affect  His behavior is normal    Vitals reviewed  Assessment/Plan:  Diagnoses and all orders for this visit:    Benign essential hypertension        -     BP goal less than 130/80--numbers are stable at this time, continue lisinopril    Memory loss or impairment        -     likely multifactorial--NORMAN vs anxiety--keep appointment for sleep study and increase physical activity        -     follow-up sooner than recommended if sleep study is unremarkable and symptoms persist    Witnessed episode of apnea          Return in about 4 months (around 7/20/2019) for Recheck memory, snoring  The patient indicates understanding of these issues and agrees with the plan          Juanis Montes De Oca DO

## 2019-04-23 DIAGNOSIS — G47.33 OSA (OBSTRUCTIVE SLEEP APNEA): Primary | ICD-10-CM

## 2019-04-26 ENCOUNTER — HOSPITAL ENCOUNTER (OUTPATIENT)
Dept: SLEEP CENTER | Facility: CLINIC | Age: 44
Discharge: HOME | End: 2019-04-26
Payer: COMMERCIAL

## 2019-04-26 DIAGNOSIS — G47.33 OSA (OBSTRUCTIVE SLEEP APNEA): ICD-10-CM

## 2019-04-26 PROCEDURE — G0399 HOME SLEEP TEST/TYPE 3 PORTA: HCPCS

## 2019-05-02 ENCOUNTER — TELEPHONE (OUTPATIENT)
Dept: SLEEP CENTER | Facility: CLINIC | Age: 44
End: 2019-05-02

## 2019-05-29 DIAGNOSIS — I10 ESSENTIAL HYPERTENSION: ICD-10-CM

## 2019-05-29 RX ORDER — LISINOPRIL 10 MG/1
10 TABLET ORAL DAILY
Qty: 90 TABLET | Refills: 1 | Status: SHIPPED | OUTPATIENT
Start: 2019-05-29 | End: 2019-08-30 | Stop reason: SDUPTHER

## 2019-06-06 ENCOUNTER — OFFICE VISIT (OUTPATIENT)
Dept: SLEEP CENTER | Facility: CLINIC | Age: 44
End: 2019-06-06
Payer: COMMERCIAL

## 2019-06-06 VITALS
HEIGHT: 73 IN | BODY MASS INDEX: 26.9 KG/M2 | WEIGHT: 203 LBS | SYSTOLIC BLOOD PRESSURE: 100 MMHG | DIASTOLIC BLOOD PRESSURE: 60 MMHG

## 2019-06-06 DIAGNOSIS — G47.61 PLMD (PERIODIC LIMB MOVEMENT DISORDER): Primary | ICD-10-CM

## 2019-06-06 PROCEDURE — 99214 OFFICE O/P EST MOD 30 MIN: CPT | Performed by: INTERNAL MEDICINE

## 2019-06-06 RX ORDER — PRAMIPEXOLE DIHYDROCHLORIDE 0.25 MG/1
TABLET ORAL
Qty: 90 TABLET | Refills: 2 | Status: SHIPPED | OUTPATIENT
Start: 2019-06-06 | End: 2019-10-10

## 2019-08-30 DIAGNOSIS — I10 ESSENTIAL HYPERTENSION: ICD-10-CM

## 2019-08-30 RX ORDER — LISINOPRIL 10 MG/1
10 TABLET ORAL DAILY
Qty: 90 TABLET | Refills: 1 | Status: SHIPPED | OUTPATIENT
Start: 2019-08-30 | End: 2019-11-25 | Stop reason: SDUPTHER

## 2019-09-11 ENCOUNTER — OFFICE VISIT (OUTPATIENT)
Dept: SLEEP CENTER | Facility: CLINIC | Age: 44
End: 2019-09-11
Payer: COMMERCIAL

## 2019-09-11 VITALS
WEIGHT: 204 LBS | DIASTOLIC BLOOD PRESSURE: 68 MMHG | BODY MASS INDEX: 27.04 KG/M2 | SYSTOLIC BLOOD PRESSURE: 110 MMHG | HEIGHT: 73 IN

## 2019-09-11 DIAGNOSIS — J31.0 CHRONIC RHINITIS: Primary | ICD-10-CM

## 2019-09-11 PROCEDURE — 99213 OFFICE O/P EST LOW 20 MIN: CPT | Performed by: INTERNAL MEDICINE

## 2019-09-11 NOTE — PROGRESS NOTES
Progress Note - Sleep Center   Sarah Bustamante :1975 MRN: [id number]      Reason for Visit:    41 y o male who complains of on refreshing sleep    Assessment:  The patient has no obstructive sleep apnea on polysomnography and his symptoms are not consistent with restless legs  He seems to get the best results from saline sinus rinse and feels that his nasal congestion may be the primary problem that his keeping him from getting restorative sleep  The patient has been dependent on Afrin nasal spray in the past, but I suggested that he use only one spray each nostril only at bedtime  Other measures including antihistamines, nasal steroids and sinus surgery have been ineffective in the past     Plan:  Continue saline sinus rinse  Take oxymetolazone (Afrin) one spray in each nostril at bedtime  The patient complains of numbness in his arms and legs  Consider gabapentin at bedtime for treatment of neuropathy, which may improve his sleep  Follow up:  As needed    History of Present Illness: The patient has a history of non restorative sleep  He had symptoms which were possibly consistent with restless legs syndrome  I gave him a trial of pramipexole, which was not effective at a dosage of 0 75 mg  He feels that saline sinus rinse is the most beneficial treatment he has used with regards to improving sleep quality  A number of interventions for his nasal congestion have been ineffective, including sinus surgery      Review of Systems      Genitourinary none   Cardiology none   Gastrointestinal abdominal pain or cramping that disturb sleep    Neurology need to move extremities, numbness/tingling of an extremity, forgetfulness, poor concentration or confusion,  and difficulty with memory   Constitutional fatigue   Integumentary itching   Psychiatry aggressiveness or irritability   Musculoskeletal none   Pulmonary none   ENT ringing in ears   Endocrine none   Hematological none           I have reviewed and updated the review of systems as necessary     Historical Information    Past Medical History:   Diagnosis Date    Anxiety     Colon, diverticulosis     Last assessed 06/10/12    Epidermal inclusion cyst     Last assessed 13    Laceration of thumb, left 3/2/2017    Lyme disease     Multinodular goiter     Subcentimer thyroid nodules bilateral on u/s 2008 09,10    Onychomycosis     Last assessed 01/19/15    Rhinitis medicamentosa     Last assessed 13    Vertigo     Last assessed 13         Past Surgical History:   Procedure Laterality Date    COLONOSCOPY  2011    Diverticulious Dr [facility] Right     Fixation of fracture of one metacarpal bone - 5th ([facility])    SEPTOPLASTY  2013         Social History     Socioeconomic History    Marital status: /Civil Union     Spouse name: None    Number of children: None    Years of education: Completed associates degree    Highest education level: None   Occupational History    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   Social Needs    Financial resource strain: None    Food insecurity:     Worry: None     Inability: None    Transportation needs:     Medical: None     Non-medical: None   Tobacco Use    Smoking status: Former Smoker     Packs/day: 1 00     Years: 9 00     Pack years: 9 00     Last attempt to quit:      Years since quittin 7    Smokeless tobacco: Former User   Substance and Sexual Activity    Alcohol use: Yes     Comment: social    Drug use: No    Sexual activity: Yes     Partners: Female   Lifestyle    Physical activity:     Days per week: None     Minutes per session: None    Stress: None   Relationships    Social connections:     Talks on phone: None     Gets together: None     Attends Zoroastrian service: None     Active member of club or organization: None     Attends meetings of clubs or organizations: None     Relationship status: None    Intimate partner violence:     Fear of current or ex partner: None     Emotionally abused: None     Physically abused: None     Forced sexual activity: None   Other Topics Concern    None   Social History Narrative    None           History   Alcohol use: Not on file       History   Smoking Status    Not on file   Smokeless Tobacco    Not on file       Family History:   Family History   Problem Relation Age of Onset    No Known Problems Mother     No Known Problems Father        Medications/Allergies:      Current Outpatient Medications:     lisinopril (ZESTRIL) 10 mg tablet, Take 1 tablet (10 mg total) by mouth daily, Disp: 90 tablet, Rfl: 1    Multiple Vitamins tablet, Take 1 tablet by mouth daily, Disp: , Rfl:     naproxen sodium (ALEVE) 220 MG tablet, Take by mouth, Disp: , Rfl:     pramipexole (MIRAPEX) 0 25 mg tablet, 1/2 to 3 tablets by mouth bedtime (Patient not taking: Reported on 9/11/2019), Disp: 90 tablet, Rfl: 2      Objective    Vital Signs:   Vitals:    09/11/19 1445   BP: 110/68   Weight: 92 5 kg (204 lb)   Height: 6' 0 5" (1 842 m)     [place] Sleepiness Scale: Total score: 7    Physical Exam:    General: Alert, appropriate, cooperative, overweight    Head: NC/AT    Skin: Warm, dry    Neuro: No motor abnormalities, cranial nerves appear intact    Psych: Normal affect            VIANEY Forde    Board Certified Sleep Specialist

## 2019-10-10 ENCOUNTER — OFFICE VISIT (OUTPATIENT)
Dept: FAMILY MEDICINE CLINIC | Facility: OTHER | Age: 44
End: 2019-10-10
Payer: COMMERCIAL

## 2019-10-10 VITALS
DIASTOLIC BLOOD PRESSURE: 70 MMHG | OXYGEN SATURATION: 98 % | SYSTOLIC BLOOD PRESSURE: 130 MMHG | TEMPERATURE: 97.5 F | BODY MASS INDEX: 27.17 KG/M2 | WEIGHT: 205 LBS | HEIGHT: 73 IN | HEART RATE: 60 BPM

## 2019-10-10 DIAGNOSIS — I10 BENIGN ESSENTIAL HYPERTENSION: ICD-10-CM

## 2019-10-10 DIAGNOSIS — Z28.21 REFUSED INFLUENZA VACCINE: ICD-10-CM

## 2019-10-10 DIAGNOSIS — Z00.00 ANNUAL PHYSICAL EXAM: Primary | ICD-10-CM

## 2019-10-10 PROCEDURE — 99396 PREV VISIT EST AGE 40-64: CPT | Performed by: FAMILY MEDICINE

## 2019-10-10 NOTE — PATIENT INSTRUCTIONS

## 2019-10-10 NOTE — PROGRESS NOTES
Ghislaine JURADO    NAME: Josselin Carranza  AGE: 45 y o  SEX: male  : 1975     DATE: 10/10/2019       Chief Complaint:     Chief Complaint   Patient presents with    Annual Exam      History of Present Illness:     Adult Annual Physical   Patient here for a comprehensive physical exam  The patient reports no problems  Diet and Physical Activity  · Diet/Nutrition: well balanced diet, limited junk food, limited fruits/vegetables and adequate fiber intake  · Exercise: walking, moderate cardiovascular exercise, strength training exercises and 3-4 times a week on average  Depression Screening  PHQ-9 Depression Screening    PHQ-9:    Frequency of the following problems over the past two weeks:       Little interest or pleasure in doing things:  0 - not at all  Feeling down, depressed, or hopeless:  0 - not at all  Trouble falling or staying asleep, or sleeping too much:  0 - not at all  Feeling tired or having little energy:  1 - several days  Poor appetite or overeatin - several days  Feeling bad about yourself - or that you are a failure or have let yourself or your family down:  0 - not at all  Trouble concentrating on things, such as reading the newspaper or watching television:  1 - several days  Moving or speaking so slowly that other people could have noticed  Or the opposite - being so fidgety or restless that you have been moving around a lot more than usual:  1 - several days  Thoughts that you would be better off dead, or of hurting yourself in some way:  0 - not at all  PHQ-2 Score:  0       General Health  · Sleep: sleeps well and gets 4-6 hours of sleep on average  · Hearing: normal - bilateral   · Vision: goes for regular eye exams and wears glasses  · Dental: regular dental visits          Health  · Symptoms include: none     Review of Systems:     Review of Systems   Constitutional: Negative for appetite change, fatigue, fever and unexpected weight change  HENT: Negative for congestion, dental problem, ear pain, postnasal drip, sore throat and tinnitus  Eyes: Negative for pain, discharge and visual disturbance  Respiratory: Negative for cough, shortness of breath and wheezing  Cardiovascular: Negative for chest pain, palpitations and leg swelling  Gastrointestinal: Negative for abdominal pain, constipation, diarrhea, nausea and vomiting  Endocrine: Negative for cold intolerance and heat intolerance  Genitourinary: Negative for difficulty urinating, dysuria, flank pain and urgency  Musculoskeletal: Negative for arthralgias, back pain, joint swelling and myalgias  Skin: Negative for rash and wound  Allergic/Immunologic: Negative for immunocompromised state  Neurological: Negative for dizziness, syncope, speech difficulty, weakness and numbness  Hematological: Negative for adenopathy  Does not bruise/bleed easily  Psychiatric/Behavioral: Negative for confusion, dysphoric mood and sleep disturbance  The patient is not nervous/anxious         Past Medical History:     Past Medical History:   Diagnosis Date    Anxiety     Colon, diverticulosis     Last assessed 06/10/12    Epidermal inclusion cyst     Last assessed 05/17/13    Laceration of thumb, left 3/2/2017    Lyme disease     Multinodular goiter     Subcentimer thyroid nodules bilateral on u/s 2008 09,10    Onychomycosis     Last assessed 01/19/15    Rhinitis medicamentosa     Last assessed 07/24/13    Vertigo     Last assessed 12/06/13      Past Surgical History:     Past Surgical History:   Procedure Laterality Date    COLONOSCOPY  08/2011    Diverticulious [facility] OTHER SURGICAL HISTORY Right     Fixation of fracture of one metacarpal bone - 5th ([facility])    SEPTOPLASTY  12/2013      Family History:     Family History   Problem Relation Age of Onset    No Known Problems Mother     No Known Problems Father       Social History:     Social History     Socioeconomic History    Marital status: /Civil Union     Spouse name: None    Number of children: None    Years of education: Completed associates degree    Highest education level: None   Occupational History    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   Social Needs    Financial resource strain: None    Food insecurity:     Worry: None     Inability: None    Transportation needs:     Medical: None     Non-medical: None   Tobacco Use    Smoking status: Former Smoker     Packs/day: 1 00     Years: 9 00     Pack years: 9 00     Last attempt to quit:      Years since quittin 7    Smokeless tobacco: Former User   Substance and Sexual Activity    Alcohol use: Yes     Comment: social    Drug use: No    Sexual activity: Yes     Partners: Female   Lifestyle    Physical activity:     Days per week: None     Minutes per session: None    Stress: None   Relationships    Social connections:     Talks on phone: None     Gets together: None     Attends Evangelical service: None     Active member of club or organization: None     Attends meetings of clubs or organizations: None     Relationship status: None    Intimate partner violence:     Fear of current or ex partner: None     Emotionally abused: None     Physically abused: None     Forced sexual activity: None   Other Topics Concern    None   Social History Narrative    None      Current Medications:     Current Outpatient Medications   Medication Sig Dispense Refill    lisinopril (ZESTRIL) 10 mg tablet Take 1 tablet (10 mg total) by mouth daily 90 tablet 1    Multiple Vitamins tablet Take 1 tablet by mouth daily      naproxen sodium (ALEVE) 220 MG tablet Take by mouth       No current facility-administered medications for this visit  Allergies:      Allergies   Allergen Reactions    Penicillins Rash      Physical Exam:     /70 (BP Location: Left arm, Patient Position: Sitting, Cuff Size: Large)   Pulse 60   Temp 97 5 °F (36 4 °C) (Tympanic)   Ht 6' 0 5" (1 842 m)   Wt 93 kg (205 lb)   SpO2 98%   BMI 27 42 kg/m²     Physical Exam   Constitutional: He is oriented to person, place, and time  He appears well-developed and well-nourished  No distress  Body mass index is 27 42 kg/m²  HENT:   Head: Normocephalic and atraumatic  Right Ear: Hearing, tympanic membrane, external ear and ear canal normal    Left Ear: Hearing, tympanic membrane, external ear and ear canal normal    Nose: Nose normal    Mouth/Throat: Uvula is midline, oropharynx is clear and moist and mucous membranes are normal  No oropharyngeal exudate  Eyes: Pupils are equal, round, and reactive to light  Conjunctivae and EOM are normal  No scleral icterus  Neck: Normal range of motion  Neck supple  No thyromegaly present  Cardiovascular: Normal rate, regular rhythm and normal heart sounds  No murmur heard  Pulmonary/Chest: Effort normal and breath sounds normal  No respiratory distress  He has no wheezes  He has no rales  Abdominal: Soft  Bowel sounds are normal  He exhibits no distension and no mass  There is no tenderness  Musculoskeletal: Normal range of motion  He exhibits no edema, tenderness or deformity  Lymphadenopathy:     He has no cervical adenopathy  Neurological: He is alert and oriented to person, place, and time  He has normal reflexes  No cranial nerve deficit  Coordination normal    Skin: Skin is warm and dry  No rash noted  No erythema  No pallor  Psychiatric: He has a normal mood and affect  His behavior is normal    Nursing note and vitals reviewed             Assessment and Plan:     Problem List Items Addressed This Visit        Cardiovascular and Mediastinum    Benign essential hypertension    Relevant Orders    Lipid panel    Comprehensive metabolic panel      Other Visit Diagnoses     Annual physical exam    -  Primary    BMI 27 0-27 9,adult        Refused influenza vaccine        Relevant Orders    influenza vaccine, 7941-6584, quadrivalent, 0 5 mL, preservative-free, for adult and pediatric patients 6 mos+ (AFLURIA, FLUARIX, FLULAVAL, FLUZONE)          Immunizations and preventive care screenings were discussed with patient today  Appropriate education was printed on patient's after visit summary  Counseling:  Alcohol/drug use: discussed moderation in alcohol intake, the recommendations for healthy alcohol use, and avoidance of illicit drug use  Dental Health: discussed importance of regular tooth brushing, flossing, and dental visits  Injury prevention: discussed safety/seat belts, safety helmets, smoke detectors, carbon dioxide detectors, and smoking near bedding or upholstery  Sexual health: discussed sexually transmitted diseases, partner selection, use of condoms, avoidance of unintended pregnancy, and contraceptive alternatives  · Exercise: the importance of regular exercise/physical activity was discussed  Recommend exercise 3-5 times per week for at least 30 minutes  BMI Counseling: Body mass index is 27 42 kg/m²  The BMI is above normal  Nutrition recommendations include decreasing portion sizes, encouraging healthy choices of fruits and vegetables, decreasing fast food intake, consuming healthier snacks, limiting drinks that contain sugar, moderation in carbohydrate intake, increasing intake of lean protein, reducing intake of saturated and trans fat and reducing intake of cholesterol  Exercise recommendations include moderate physical activity 150 minutes/week  Return in about 6 months (around 4/10/2020) for Recheck HTN (15 min)  The patient indicates understanding of these issues and agrees with the plan         Clare Tate DO   [address] [facility] [address],[place] [address]

## 2019-11-22 ENCOUNTER — TELEPHONE (OUTPATIENT)
Dept: FAMILY MEDICINE CLINIC | Facility: OTHER | Age: 44
End: 2019-11-22

## 2019-11-22 NOTE — TELEPHONE ENCOUNTER
Pt requesting new referral to dermatology last one he was given was from 1/2018  [facility] is going to [facility] in [place] and is requesting we fax it once its in the system

## 2019-11-25 DIAGNOSIS — L29.9 PRURITUS: Primary | ICD-10-CM

## 2019-11-25 DIAGNOSIS — I10 ESSENTIAL HYPERTENSION: ICD-10-CM

## 2019-11-25 RX ORDER — LISINOPRIL 10 MG/1
10 TABLET ORAL DAILY
Qty: 90 TABLET | Refills: 1 | Status: SHIPPED | OUTPATIENT
Start: 2019-11-25 | End: 2020-02-26 | Stop reason: SDUPTHER

## 2020-01-20 LAB
ALBUMIN SERPL-MCNC: 4.5 G/DL (ref 3.6–5.1)
ALBUMIN/GLOB SERPL: 2 (CALC) (ref 1–2.5)
ALP SERPL-CCNC: 54 U/L (ref 40–115)
ALT SERPL-CCNC: 28 U/L (ref 9–46)
AST SERPL-CCNC: 23 U/L (ref 10–40)
BILIRUB SERPL-MCNC: 0.7 MG/DL (ref 0.2–1.2)
BUN SERPL-MCNC: 16 MG/DL (ref 7–25)
BUN/CREAT SERPL: ABNORMAL (CALC) (ref 6–22)
CALCIUM SERPL-MCNC: 9.9 MG/DL (ref 8.6–10.3)
CHLORIDE SERPL-SCNC: 103 MMOL/L (ref 98–110)
CHOLEST SERPL-MCNC: 204 MG/DL
CHOLEST/HDLC SERPL: 3.8 (CALC)
CO2 SERPL-SCNC: 28 MMOL/L (ref 20–32)
CREAT SERPL-MCNC: 0.96 MG/DL (ref 0.6–1.35)
GLOBULIN SER CALC-MCNC: 2.2 G/DL (CALC) (ref 1.9–3.7)
GLUCOSE SERPL-MCNC: 104 MG/DL (ref 65–99)
HDLC SERPL-MCNC: 54 MG/DL
LDLC SERPL CALC-MCNC: 132 MG/DL (CALC)
NONHDLC SERPL-MCNC: 150 MG/DL (CALC)
POTASSIUM SERPL-SCNC: 4.6 MMOL/L (ref 3.5–5.3)
PROT SERPL-MCNC: 6.7 G/DL (ref 6.1–8.1)
SL AMB EGFR AFRICAN AMERICAN: 113 ML/MIN/1.73M2
SL AMB EGFR NON AFRICAN AMERICAN: 97 ML/MIN/1.73M2
SODIUM SERPL-SCNC: 137 MMOL/L (ref 135–146)
TRIGL SERPL-MCNC: 82 MG/DL

## 2020-01-21 ENCOUNTER — OFFICE VISIT (OUTPATIENT)
Dept: FAMILY MEDICINE CLINIC | Facility: OTHER | Age: 45
End: 2020-01-21
Payer: COMMERCIAL

## 2020-01-21 VITALS
DIASTOLIC BLOOD PRESSURE: 72 MMHG | SYSTOLIC BLOOD PRESSURE: 128 MMHG | BODY MASS INDEX: 27.72 KG/M2 | HEART RATE: 64 BPM | WEIGHT: 209.13 LBS | OXYGEN SATURATION: 97 % | HEIGHT: 73 IN | TEMPERATURE: 97.5 F

## 2020-01-21 DIAGNOSIS — M54.9 UPPER BACK PAIN ON LEFT SIDE: ICD-10-CM

## 2020-01-21 DIAGNOSIS — Z28.21 REFUSED INFLUENZA VACCINE: ICD-10-CM

## 2020-01-21 DIAGNOSIS — M25.512 ACUTE PAIN OF LEFT SHOULDER: Primary | ICD-10-CM

## 2020-01-21 PROCEDURE — 99214 OFFICE O/P EST MOD 30 MIN: CPT | Performed by: FAMILY MEDICINE

## 2020-01-21 PROCEDURE — 3008F BODY MASS INDEX DOCD: CPT | Performed by: FAMILY MEDICINE

## 2020-01-21 RX ORDER — METHYLPREDNISOLONE 4 MG/1
TABLET ORAL
Qty: 21 EACH | Refills: 0 | Status: SHIPPED | OUTPATIENT
Start: 2020-01-21 | End: 2020-05-20

## 2020-01-21 NOTE — PATIENT INSTRUCTIONS
Rotator Cuff Injury   WHAT YOU NEED TO KNOW:   A rotator cuff injury is damage to the muscles or tendons of your rotator cuff  The rotator cuff is made up of a group of muscles and tendons that hold the shoulder joint in place  The damage may include stretching of your muscle or tears in the tendons  It may also include inflammation of the bursa (small sack of fluid around the joint)  DISCHARGE INSTRUCTIONS:   · Acetaminophen: This medicine decreases pain  Acetaminophen is available without a doctor's order  Ask how much to take and how often to take it  Follow directions  Acetaminophen can cause liver damage if not taken correctly  · NSAIDs:  These medicines decrease swelling, pain, and fever  NSAIDs are available without a doctor's order  Ask your healthcare provider which medicine is right for you  Ask how much to take and when to take it  Take as directed  NSAIDs can cause stomach bleeding or kidney problems if not taken correctly  · Pain medicine: You may be given a prescription medicine to decrease pain  Do not wait until the pain is severe before you take this medicine  · Steroids: This medicine may be injected into the rotator cuff area to decrease inflammation and pain  · Take your medicine as directed  Contact your healthcare provider if you think your medicine is not helping or if you have side effects  Tell him of her if you are allergic to any medicine  Keep a list of the medicines, vitamins, and herbs you take  Include the amounts, and when and why you take them  Bring the list or the pill bottles to follow-up visits  Carry your medicine list with you in case of an emergency  Follow up with your healthcare provider or orthopedist as directed:  Write down your questions so you remember to ask them during your visits  Physical therapy:  A physical therapist can teach you exercises to help improve movement and strength, and to decrease pain   These exercises may help you go back to your usual activities or return to playing sports  Self-care:   · Rest:  Rest may help your shoulder heal  Overuse of your shoulder can make your injury worse  Avoid heavy lifting, using your arms over your head, or any other activity that makes the pain worse  · Put ice or heat on your shoulder:  Use ice on your shoulder every few hours for the first several days  This may help decrease pain and swelling  After the first several days, a heating pad may help relax the muscles in your shoulder  Contact your healthcare provider or orthopedist if:   · The pain in your shoulder or arm is not improving, or is worse than before you started treatment  · You have new pain in your neck  · You have a fever  · You have questions or concerns about your condition or care  Return to the emergency department if:  · You suddenly cannot move your arm  · You have severe stomach or back pain, are vomiting blood, or have black bowel movements  © 2017 [employer]  Information is for End User's use only and may not be sold, redistributed or otherwise used for commercial purposes  All illustrations and images included in CareNotes® are the copyrighted property of [employer]  or [employer]  The above information is an  only  It is not intended as medical advice for individual conditions or treatments  Talk to your doctor, nurse or pharmacist before following any medical regimen to see if it is safe and effective for you

## 2020-01-21 NOTE — PROGRESS NOTES
Assessment/Plan:       Problem List Items Addressed This Visit     None      Visit Diagnoses     Acute pain of left shoulder    -  Primary    Relevant Medications: The son exam appears to be musculoskeletal in nature from there is no bony tenderness and there is no restriction of movement of the shoulder joint  Recommended a trial of Medrol Dosepak for a week he was advised to take Tylenol p r n  For discomfort if he needs additional medication  He will avoid Aleve until he is done with the methyleprednisolone prescription  trial of physical therapy recommended as well he was advised to rest his left shoulder for the next 2 weeks and avoid any heavy lifting pushing pulling or activities that include straining of the left upper extremity  Handout for self care was provided to him today  He was advised to follow-up if no improvement or any worsening of symptoms any time  methylPREDNISolone 4 MG tablet therapy pack    Other Relevant Orders    Ambulatory referral to Physical Therapy    Refused influenza vaccine        Relevant Orders    influenza vaccine, [id number], quadrivalent, 0 5 mL, preservative-free, for adult and pediatric patients 6 mos+ (AFLURIA, FLUARIX, FLULAVAL, FLUZONE)    Upper back pain on left side        Relevant Medications    methylPREDNISolone 4 MG tablet therapy pack    Other Relevant Orders    Ambulatory referral to Physical Therapy            Subjective:      Patient ID: Rose Mukherjee is a 45 y o  male  Shoulder Pain    The pain is present in the left shoulder  This is a new problem  The current episode started more than 1 month ago  There has been no history of extremity trauma  The problem occurs intermittently  The problem has been unchanged  The quality of the pain is described as sharp  The pain is moderate  Associated symptoms include stiffness  Pertinent negatives include no fever, inability to bear weight, itching, joint locking, numbness or tingling   The symptoms are aggravated by activity  He has tried NSAIDS (OTC aleve helps but he doesn't like to take regularly due to side effects ) for the symptoms  The treatment provided no relief  Family history does not include gout or rheumatoid arthritis  There is no history of diabetes, gout or rheumatoid arthritis  past history of lyme disease that is treated 20 years ago  The following portions of the patient's history were reviewed and updated as appropriate:   He  has a past medical history of Anxiety, Colon, diverticulosis, Epidermal inclusion cyst, Laceration of thumb, left (3/2/2017), Lyme disease, Multinodular goiter, Onychomycosis, Rhinitis medicamentosa, and Vertigo  He   Patient Active Problem List    Diagnosis Date Noted    NORMAN (obstructive sleep apnea)     Pruritus 01/09/2018    Generalized headaches 01/11/2017    Cardiovascular disease 01/11/2017    Fatigue 06/15/2015    Benign essential hypertension 09/13/2012    Colon, diverticulosis 06/10/2012    Mitral valve disorder 06/10/2012     He  has a past surgical history that includes Colonoscopy (08/2011); Other surgical history (Right); and Septoplasty (12/2013)  His family history includes No Known Problems in his father and mother  He  reports that he quit smoking about 12 years ago  He has a 9 00 pack-year smoking history  He has quit using smokeless tobacco  He reports that he drinks alcohol  He reports that he does not use drugs  Current Outpatient Medications   Medication Sig Dispense Refill    lisinopril (ZESTRIL) 10 mg tablet Take 1 tablet (10 mg total) by mouth daily 90 tablet 1    Multiple Vitamins tablet Take 1 tablet by mouth daily      naproxen sodium (ALEVE) 220 MG tablet Take by mouth      methylPREDNISolone 4 MG tablet therapy pack Use as directed on package 21 each 0     No current facility-administered medications for this visit        Current Outpatient Medications on File Prior to Visit   Medication Sig    lisinopril (ZESTRIL) 10 mg tablet Take 1 tablet (10 mg total) by mouth daily    Multiple Vitamins tablet Take 1 tablet by mouth daily    naproxen sodium (ALEVE) 220 MG tablet Take by mouth     No current facility-administered medications on file prior to visit  He is allergic to penicillins       Review of Systems   Constitutional: Negative for appetite change, chills, fever and unexpected weight change  Respiratory: Negative for cough and shortness of breath  Cardiovascular: Negative for chest pain and leg swelling  Gastrointestinal: Negative for abdominal pain, nausea and vomiting  Musculoskeletal: Positive for arthralgias, myalgias and stiffness  Negative for gout, neck pain and neck stiffness  Skin: Negative for itching and rash  Neurological: Negative for tingling, tremors, weakness and numbness  Objective:      /72 (BP Location: Right arm, Patient Position: Sitting, Cuff Size: Large)   Pulse 64   Temp 97 5 °F (36 4 °C) (Tympanic)   Ht 6' 0 5" (1 842 m)   Wt 94 9 kg (209 lb 2 oz)   SpO2 97%   BMI 27 97 kg/m²          Physical Exam   Constitutional: He is oriented to person, place, and time  He appears well-developed and well-nourished  No distress  HENT:   Head: Normocephalic and atraumatic  Neck: Normal range of motion  Neck supple  Cardiovascular: Normal rate, regular rhythm, normal heart sounds and intact distal pulses  No murmur heard  Pulmonary/Chest: Effort normal and breath sounds normal  No respiratory distress  He has no wheezes  Abdominal: Soft  Bowel sounds are normal  He exhibits no distension  There is no tenderness  Musculoskeletal: Normal range of motion  He exhibits tenderness  He exhibits no deformity  Right shoulder: He exhibits tenderness  He exhibits normal range of motion, no bony tenderness, no swelling and no deformity  Arms:  Neurological: He is alert and oriented to person, place, and time  Skin: Skin is warm and dry  No rash noted   He is not diaphoretic  No erythema  No pallor  Psychiatric: He has a normal mood and affect  His behavior is normal    Nursing note and vitals reviewed

## 2020-01-27 ENCOUNTER — EVALUATION (OUTPATIENT)
Dept: PHYSICAL THERAPY | Facility: REHABILITATION | Age: 45
End: 2020-01-27
Payer: COMMERCIAL

## 2020-01-27 DIAGNOSIS — M25.512 ACUTE PAIN OF LEFT SHOULDER: Primary | ICD-10-CM

## 2020-01-27 DIAGNOSIS — M54.9 UPPER BACK PAIN ON LEFT SIDE: ICD-10-CM

## 2020-01-27 PROCEDURE — 97162 PT EVAL MOD COMPLEX 30 MIN: CPT | Performed by: PHYSICAL THERAPIST

## 2020-01-27 NOTE — PROGRESS NOTES
PT Evaluation     Today's date: 2020  Patient name: Christy Gasca  : 1975  MRN: [id number]  Referring provider: Viky York MD  Dx:   Encounter Diagnosis     ICD-10-CM    1  Acute pain of left shoulder M25 512 Ambulatory referral to Physical Therapy   2  Upper back pain on left side M54 9 Ambulatory referral to Physical Therapy       Start Time: 1600  Stop Time: 1645  Total time in clinic (min): 45 minutes    Assessment  Assessment details: Christy Gasca is a 45y o  year old male who presents to  with:   Acute pain of left shoulder  (primary encounter diagnosis)  Upper back pain on left side    Gideon Ho presents with the impairments as listed above and would benefit from Physical Therapy to address these impairments and to maximize function  Impairments: abnormal or restricted ROM, activity intolerance, impaired physical strength, lacks appropriate home exercise program, pain with function and scapular dyskinesis  Functional limitations: reaching, lifting, overhead activity, use of L UEBarriers to therapy: Chronic pain, HTN, back pain    Understanding of Dx/Px/POC: good   Prognosis: good    Goals  Short-term goals:   1  Patient's pain will be decreased to 5/10 at worse within 4 weeks  2  Patient's strength will increase to 4+/5 within 4 weeks     Long-term goals:   1  Patient will be able to perform lift L UE with minimal to no pain at time of discharge  2  Patient will be able to perform perform IADL with minimal to no pain at time of discharge  3  Patient will be independent in HEP at time of discharge  Plan  Plan details: Thank you for referring Christy Gasca to Physical Therapy at [facility] and for the opportunity to coordinate care      Patient would benefit from: skilled PT and PT eval  Planned modality interventions: electrical stimulation/Russian stimulation, cryotherapy and unattended electrical stimulation  Planned therapy interventions: home exercise program, manual therapy, patient education, postural training, strengthening, stretching, therapeutic activities, therapeutic exercise, joint mobilization, IADL retraining and ADL training  Frequency: 2x week  Duration in visits: 10  Plan of Care beginning date: 2020  Treatment plan discussed with: patient and PTA        Subjective Evaluation    History of Present Illness  Mechanism of injury: Tiarra Hunter presents to IE with L shoulder pain  Symptoms began: mid-December  LIOR: "woke up one morning, and all the muscles in my neck and my shoulder were really tight  The same thing happened March last year, but this time it's more intense  It's different this time, more muscles in my neck and shoulders " Patient reports he has tried Aleve and TENS unit at home  He reports he is also on steroid as prescribed by MD, noting "it's helped, but the Aleve has helped the most " Patient is LHD, but is able to do most things with right hand  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  Pain at rest in L shoulder: 3/10  Pain at best: 0/10  Pain at worst: 7/10  Numbness/ tingling: no   Activities that increase pain: lifting, reaching, overhead work, patient noting "it's not necessarily while I do the activity, it's later on that day, especially if I've had a more strenuous day "   No imaging performed at this time         Quality of life: fair    Pain  Current pain rating: 3  At best pain ratin  At worst pain ratin  Location: L shoulder   Relieving factors: medications  Aggravating factors: lifting and overhead activity  Progression: no change    Treatments  Current treatment: physical therapy  Patient Goals  Patient goals for therapy: decreased pain, increased motion, increased strength and independence with ADLs/IADLs          Objective     Postural Observations    Additional Postural Observation Details  Patient demonstrates following posture: Increased thoracic kyphosis   Cervical protrusion  Rounded shoulders, increased IR     Palpation   Left   Hypertonic in the rhomboids and upper trapezius  Trigger point to rhomboids and upper trapezius  Tenderness     Left Shoulder   Tenderness in the biceps tendon (proximal) and bicipital groove  Neurological Testing     Sensation   Cervical/Thoracic   Left   Intact: light touch    Right   Intact: light touch    Additional Neurological Details  Patient denies N/T in R UE and L UE at this time  Sensation intact during IE  Active Range of Motion   Cervical/Thoracic Spine       Cervical    Flexion: 50 degrees   Extension: 40 degrees     with pain  Left lateral flexion: 20 degrees     with pain  Right lateral flexion: 20 degrees      Left rotation: 70 degrees  Right rotation: 60 degrees         Left Shoulder   Flexion: 150 degrees   Abduction: 150 degrees     Right Shoulder   Flexion: 160 degrees   Abduction: 160 degrees     Passive Range of Motion   Left Shoulder   Flexion: 160 degrees   Abduction: 160 degrees   External rotation 90°: 80 degrees   Internal rotation 90°: 70 degrees     Joint Play     Hypomobile: C7, T1, T2, T3, T4, T5, T6, T7, T8, T9 and T10     Pain: C7, T1, T2, T3, T4, T5, T6, T7, T8, T9 and T10     Strength/Myotome Testing     Left Shoulder     Planes of Motion   Flexion: 4   Abduction: 4   External rotation at 0°: 4   Internal rotation at 0°: 4     Right Shoulder     Planes of Motion   Flexion: 4+   Abduction: 4+   External rotation at 0°: 4+   Internal rotation at 0°: 4+     Tests     Left Shoulder   Positive empty can, Neer's, painful arc and Yergason's                    Precautions: HTN, arthritis, chronic pain   Access code:   * Indicates part of HEP     Daily Treatment Diary     Manual  1/27            P/A T-spine grade II-III             CTJ inferior mob                                                        Exercise Diary  1/27 Scapular retraction             sidelying ER             sidelying flexion             Prone rows             Prone I*, T and Y 10            Scapular retraction with ER* OTB 2x10            TB rows             TB LPD             Pulleys              Chin tucks* 10                                                                                                                                                  Modalities              CP/ MHP PRN

## 2020-01-30 ENCOUNTER — OFFICE VISIT (OUTPATIENT)
Dept: PHYSICAL THERAPY | Facility: REHABILITATION | Age: 45
End: 2020-01-30
Payer: COMMERCIAL

## 2020-01-30 DIAGNOSIS — M54.9 UPPER BACK PAIN ON LEFT SIDE: ICD-10-CM

## 2020-01-30 DIAGNOSIS — M25.512 ACUTE PAIN OF LEFT SHOULDER: Primary | ICD-10-CM

## 2020-01-30 PROCEDURE — 97110 THERAPEUTIC EXERCISES: CPT | Performed by: PHYSICAL THERAPIST

## 2020-01-30 PROCEDURE — 97140 MANUAL THERAPY 1/> REGIONS: CPT | Performed by: PHYSICAL THERAPIST

## 2020-01-30 PROCEDURE — 97112 NEUROMUSCULAR REEDUCATION: CPT | Performed by: PHYSICAL THERAPIST

## 2020-01-30 NOTE — PROGRESS NOTES
Daily Note     Today's date: 2020  Patient name: Sarah Richardson  : 1975  MRN: [id number]  Referring provider: Kieran Rubalcava MD  Dx:   Encounter Diagnosis     ICD-10-CM    1  Acute pain of left shoulder M25 512    2  Upper back pain on left side M54 9        Start Time: 1700  Stop Time: 1745  Total time in clinic (min): 45 minutes    Subjective: Felipa Chahal reports "feels like it's getting a little better, not as much pain" upon arrival to therapy today  he verbalizes compliance with HEP, denying any pain or problems at this time  Objective: See treatment diary below      Assessment: Tolerated treatment fair  Patient demonstrated fatigue post treatment and would benefit from continued PT  Patient reporting increased soreness in UBE and sidelying ER today  Good tolerance to manuals performed today and overall good tolerance to prone exercises  Plan: Continue per plan of care  Progress treatment as tolerated                 Precautions: HTN, arthritis, chronic pain   Access code:   * Indicates part of HEP     Daily Treatment Diary     Manual             P/A T-spine grade II-III  8' total           CTJ inferior mob  Done                                                       Exercise Diary             sidelying ER  2# 3x10           sidelying flexion             Prone rows             Prone I*, T and Y 10 3x10 I,  2x10  T           Scapular retraction with ER* OTB 2x10 OTB 3x10           TB low rows  GTB 3x10, :03           TB LPD             UBE retro  15W 5'            Chin tucks* 10 2x10           UT stretch  :10x10                                                                                                                                    Modalities              CP/ MHP PRN

## 2020-02-03 ENCOUNTER — OFFICE VISIT (OUTPATIENT)
Dept: PHYSICAL THERAPY | Facility: REHABILITATION | Age: 45
End: 2020-02-03
Payer: COMMERCIAL

## 2020-02-03 DIAGNOSIS — M54.9 UPPER BACK PAIN ON LEFT SIDE: ICD-10-CM

## 2020-02-03 DIAGNOSIS — M25.512 ACUTE PAIN OF LEFT SHOULDER: Primary | ICD-10-CM

## 2020-02-03 PROCEDURE — 97110 THERAPEUTIC EXERCISES: CPT | Performed by: PHYSICAL THERAPIST

## 2020-02-03 PROCEDURE — 97112 NEUROMUSCULAR REEDUCATION: CPT | Performed by: PHYSICAL THERAPIST

## 2020-02-03 PROCEDURE — 97140 MANUAL THERAPY 1/> REGIONS: CPT | Performed by: PHYSICAL THERAPIST

## 2020-02-03 NOTE — PROGRESS NOTES
Daily Note     Today's date: 2/3/2020  Patient name: Brittany Montanez  : 1975  MRN: [id number]  Referring provider: Garrett Roman MD  Dx:   Encounter Diagnosis     ICD-10-CM    1  Acute pain of left shoulder M25 512    2  Upper back pain on left side M54 9        Start Time: 1630  Stop Time: 1715  Total time in clinic (min): 45 minutes    Subjective: Alexis Medina reports "felt fine after last time, Saturday I woke up and felt good, but then later that night I think I did too much so I was sore for a little Saturday and " upon arrival to therapy today  Objective: See treatment diary below       Assessment: Tolerated treatment fair  Patient demonstrated fatigue post treatment and would benefit from continued PT Trialed horizontal abduction this session with patient tolerating well, no pain reported, evident fatigue  Patient reporting fatigue with sidelying ER, no UT pain as stated previous therapy session  Plan: Continue per plan of care  Progress treatment as tolerated                Precautions: HTN, arthritis, chronic pain   Access code:   * Indicates part of HEP     Daily Treatment Diary     Manual   2/3          P/A T-spine grade II-III  8' total 8' total           CTJ inferior mob  Done  Done                                                      Exercise Diary   2/3          sidelying ER  2# 3x10 2# 3x10 ea          sidelying flexion             Prone rows             Prone I*, T and Y 10 3x10 I,  2x10  T 3x10 I, T          Scapular retraction with ER* OTB 2x10 OTB 3x10 OTB 3x12          TB low rows  GTB 3x10, :03 GTB 3x10, :03          TB LPD             UBE retro  15W 5'  10W 5'           Chin tucks* 10 2x10 2x10          UT stretch  :10x10 :10x10          TB horizontal abduction    OTB 2x10                                                                                                                      Modalities              CP/ MHP PRN Patient taken through exercises by ANDREW LUNDY from [id number]

## 2020-02-06 ENCOUNTER — OFFICE VISIT (OUTPATIENT)
Dept: PHYSICAL THERAPY | Facility: REHABILITATION | Age: 45
End: 2020-02-06
Payer: COMMERCIAL

## 2020-02-06 DIAGNOSIS — M54.9 UPPER BACK PAIN ON LEFT SIDE: ICD-10-CM

## 2020-02-06 DIAGNOSIS — M25.512 ACUTE PAIN OF LEFT SHOULDER: Primary | ICD-10-CM

## 2020-02-06 PROCEDURE — 97140 MANUAL THERAPY 1/> REGIONS: CPT | Performed by: PHYSICAL THERAPIST

## 2020-02-06 PROCEDURE — 97112 NEUROMUSCULAR REEDUCATION: CPT | Performed by: PHYSICAL THERAPIST

## 2020-02-06 NOTE — PROGRESS NOTES
Daily Note     Today's date: 2020  Patient name: Benna Dakins  : 1975  MRN: [id number]  Referring provider: Denise Casey MD  Dx:   Encounter Diagnosis     ICD-10-CM    1  Acute pain of left shoulder M25 512    2  Upper back pain on left side M54 9        Start Time: 1625  Stop Time: 1712  Total time in clinic (min): 47 minutes    Subjective: Connie Jimenez reports "I think I overdid it the other day when I tried to do the bands at home" upon arrival to therapy today  Objective: See treatment diary below      Assessment: Tolerated treatment fair  Patient demonstrated fatigue post treatment and would benefit from continued PT  No new exercises added today secondary to increased soreness  Added IASTM to L medial scapula with fair tolerance, evident tightness felt with IASTM  Plan: Continue per plan of care  Progress treatment as tolerated                Precautions: HTN, arthritis, chronic pain   Access code:   * Indicates part of HEP     Daily Treatment Diary     Manual   2/3 2/6         P/A T-spine grade II-III  8' total 8' total  8' total          CTJ inferior mob  Done  Done           IASTM w/ rhomboid stretch    Done                                        Exercise Diary   2/3 2/6         sidelying ER  2# 3x10 2# 3x10 ea 2# 3x10         sidelying flexion             Prone rows             Prone I*, T and Y 10 3x10 I,  2x10  T 3x10 I, T 3x10 I, T          Scapular retraction with ER* OTB 2x10 OTB 3x10 OTB 3x12 OTB 3x12         TB low rows  GTB 3x10, :03 GTB 3x10, :03 GTB 3x10, :03         TB LPD             UBE retro  15W 5'  10W 5'  10W 5'          Chin tucks* 10 2x10 2x10 2x10         UT stretch  :10x10 :10x10 NP today         TB horizontal abduction    OTB 2x10 OTB 3x10                                                                                                                     Modalities              CP/ MHP PRN                                       Patient taken through exercises by ANDREW LUNDY from [id number]

## 2020-02-10 ENCOUNTER — OFFICE VISIT (OUTPATIENT)
Dept: PHYSICAL THERAPY | Facility: REHABILITATION | Age: 45
End: 2020-02-10
Payer: COMMERCIAL

## 2020-02-10 DIAGNOSIS — M25.512 ACUTE PAIN OF LEFT SHOULDER: Primary | ICD-10-CM

## 2020-02-10 DIAGNOSIS — M54.9 UPPER BACK PAIN ON LEFT SIDE: ICD-10-CM

## 2020-02-10 PROCEDURE — 97112 NEUROMUSCULAR REEDUCATION: CPT | Performed by: PHYSICAL THERAPIST

## 2020-02-10 PROCEDURE — 97110 THERAPEUTIC EXERCISES: CPT | Performed by: PHYSICAL THERAPIST

## 2020-02-10 PROCEDURE — 97140 MANUAL THERAPY 1/> REGIONS: CPT | Performed by: PHYSICAL THERAPIST

## 2020-02-10 NOTE — PROGRESS NOTES
Daily Note     Today's date: 2/10/2020  Patient name: Kuldeep Mcdaniel  : 1975  MRN: [id number]  Referring provider: Chantal Gautam MD  Dx:   Encounter Diagnosis     ICD-10-CM    1  Acute pain of left shoulder M25 512    2  Upper back pain on left side M54 9        Start Time: 1625  Stop Time: 1715  Total time in clinic (min): 50 minutes    Subjective: Maico Luz reports "doing good today, Saturday I felt really good when I woke up and I overdid it again" upon arrival to therapy today  Objective: See treatment diary below      Assessment: Tolerated treatment fair  Patient demonstrated fatigue post treatment and would benefit from continued PT  Added DNF and prone chicken wings today with fair tolerance, no pain reported by patient  Progressed in repetition and resistance with several exercises today with fair tolerance, no increase in pain  Plan: Continue per plan of care  Progress treatment as tolerated                  Precautions: HTN, arthritis, chronic pain   Access code:   * Indicates part of HEP     Daily Treatment Diary     Manual  1/27 1/30 2/3 2/6 2/10        P/A T-spine grade II-III  8' total 8' total  8' total  8'         CTJ inferior mob  Done  Done           IASTM w/ rhomboid stretch    Done  Done                                       Exercise Diary  1/27 1/30 2/3 2/6 2/10        sidelying ER  2# 3x10 2# 3x10 ea 2# 3x10 2# 3x12        sidelying flexion             Prone rows             Prone I*, T and Y 10 3x10 I,  2x10  T 3x10 I, T 3x10 I, T  3x12 I,T        Scapular retraction with ER* OTB 2x10 OTB 3x10 OTB 3x12 OTB 3x12 GTB 3x10        TB low rows  GTB 3x10, :03 GTB 3x10, :03 GTB 3x10, :03 GTB 3x12, :03        TB LPD             UBE retro  15W 5'  10W 5'  10W 5'  15W 5'         Chin tucks* 10 2x10 2x10 2x10 2x10        UT stretch  :10x10 :10x10 NP today         TB horizontal abduction    OTB 2x10 OTB 3x10 OTB 3x10        Prone chicken wings     2x10        DNF     2x10 Modalities              CP/ MHP PRN                                       Patient taken through exercises by ANDREW LUNDY from [id number]

## 2020-02-13 ENCOUNTER — OFFICE VISIT (OUTPATIENT)
Dept: PHYSICAL THERAPY | Facility: REHABILITATION | Age: 45
End: 2020-02-13
Payer: COMMERCIAL

## 2020-02-13 DIAGNOSIS — M25.512 ACUTE PAIN OF LEFT SHOULDER: Primary | ICD-10-CM

## 2020-02-13 DIAGNOSIS — M54.9 UPPER BACK PAIN ON LEFT SIDE: ICD-10-CM

## 2020-02-13 PROCEDURE — 97110 THERAPEUTIC EXERCISES: CPT | Performed by: PHYSICAL THERAPIST

## 2020-02-13 PROCEDURE — 97140 MANUAL THERAPY 1/> REGIONS: CPT | Performed by: PHYSICAL THERAPIST

## 2020-02-13 PROCEDURE — 97112 NEUROMUSCULAR REEDUCATION: CPT | Performed by: PHYSICAL THERAPIST

## 2020-02-13 NOTE — PROGRESS NOTES
Daily Note     Today's date: 2020  Patient name: Josselin Carranza  : 1975  MRN: [id number]  Referring provider: Cornelio Munoz MD  Dx:   Encounter Diagnosis     ICD-10-CM    1  Acute pain of left shoulder M25 512    2  Upper back pain on left side M54 9                   Subjective: Gamaliel Styles reports mild muscle soreness in the left shoulder and neck stating "I did a lot of physical work yesterday like shoveling and heavy lifting so it might be flared up " Patient reports a constant 2/10 pain  Objective: See treatment diary below      Assessment: Tolerated treatment fair  Patient demonstrated fatigue post treatment and would benefit from continued PT  Progressed in repetition with several exercises today with good tolerance and no increase in pain  Initiated additional cervical stretching for improved mobility and pain management  Plan: Continue per plan of care  Progress treatment as tolerated  Continue IASTM w/rhomboid stretch to patient tolerance                Precautions: HTN, arthritis, chronic pain   Access code:   * Indicates part of HEP     Daily Treatment Diary     Manual  1/27 1/30 2/3 2/6 2/10 2/13       P/A T-spine grade II-III  8' total 8' total  8' total  8'  8'       CTJ inferior mob  Done  Done           IASTM w/ rhomboid stretch    Done  Done   np today                                     Exercise Diary  1/27 1/30 2/3 2/6 2/10 2/13       sidelying ER  2# 3x10 2# 3x10 ea 2# 3x10 2# 3x12 2#  3x12       sidelying flexion             Prone rows             Prone I*, T and Y 10 3x10 I,  2x10  T 3x10 I, T 3x10 I, T  3x12 I,T 3x15  I,T       Scapular retraction with ER* OTB 2x10 OTB 3x10 OTB 3x12 OTB 3x12 GTB 3x10 GTB 3x12       TB low rows  GTB 3x10, :03 GTB 3x10, :03 GTB 3x10, :03 GTB 3x12, :03   GTB  3x12  3"       TB LPD             UBE retro  15W 5'  10W 5'  10W 5'  15W 5'  15W  6'       Chin tucks* 10 2x10 2x10 2x10 2x10 3x10       UT stretch  :10x10 :10x10 NP today TB horizontal abduction    OTB 2x10 OTB 3x10 OTB 3x10 OTB   3x12       Prone chicken wings     2x10 np today       DNF     2x10 2x10       Scalene stretch w//pillow case      :10x10                                                                            Modalities              CP/ MHP PRN                                       Patient taken through exercises by PTA KG from [id number]

## 2020-02-17 ENCOUNTER — OFFICE VISIT (OUTPATIENT)
Dept: PHYSICAL THERAPY | Facility: REHABILITATION | Age: 45
End: 2020-02-17
Payer: COMMERCIAL

## 2020-02-17 DIAGNOSIS — M25.512 ACUTE PAIN OF LEFT SHOULDER: Primary | ICD-10-CM

## 2020-02-17 DIAGNOSIS — M54.9 UPPER BACK PAIN ON LEFT SIDE: ICD-10-CM

## 2020-02-17 PROCEDURE — 97112 NEUROMUSCULAR REEDUCATION: CPT | Performed by: PHYSICAL THERAPIST

## 2020-02-17 PROCEDURE — 97110 THERAPEUTIC EXERCISES: CPT | Performed by: PHYSICAL THERAPIST

## 2020-02-17 NOTE — PROGRESS NOTES
Daily Note     Today's date: 2020  Patient name: Rose Mukherjee  : 1975  MRN: [id number]  Referring provider: Helena Watson MD  Dx:   Encounter Diagnosis     ICD-10-CM    1  Acute pain of left shoulder M25 512    2  Upper back pain on left side M54 9        Start Time: 1630  Stop Time: 1715  Total time in clinic (min): 45 minutes    Subjective: Ricardo Her reports "overall I can tell it's better, less pain" upon arrival to therapy today  Objective: See treatment diary below      Assessment: Tolerated treatment fair  Patient demonstrated fatigue post treatment and would benefit from continued PT  Patient progressed in resistance with prone I and prone T today with fair tolerance, no pain reported  Added scap punches today with fair tolerance, no pain reported  Plan: Continue per plan of care  Progress treatment as tolerated                    Precautions: HTN, arthritis, chronic pain   Access code:   * Indicates part of HEP     Daily Treatment Diary     Manual  1/27 1/30 2/3 2/6 2/10 2/13 2/17      P/A T-spine grade II-III  8' total 8' total  8' total  8'  8' 5'       CTJ inferior mob  Done  Done           IASTM w/ rhomboid stretch    Done  Done   np today                                     Exercise Diary  1/27 1/30 2/3 2/6 2/10 2/13 2/17      sidelying ER  2# 3x10 2# 3x10 ea 2# 3x10 2# 3x12 2#  3x12 3# 3x10      sidelying flexion             Prone rows             Prone I*, T and Y 10 3x10 I,  2x10  T 3x10 I, T 3x10 I, T  3x12 I,T 3x15  I,T 2# I, 1# T 3x10      Scapular retraction with ER* OTB 2x10 OTB 3x10 OTB 3x12 OTB 3x12 GTB 3x10 GTB 3x12 GTB 3x12      TB low rows  GTB 3x10, :03 GTB 3x10, :03 GTB 3x10, :03 GTB 3x12, :03   GTB  3x12  3" GTB 3x12, :03      TB LPD             UBE retro  15W 5'  10W 5'  10W 5'  15W 5'  15W  6' 20W 5'       Chin tucks* 10 2x10 2x10 2x10 2x10 3x10 3x10      UT stretch  :10x10 :10x10 NP today         TB horizontal abduction    OTB 2x10 OTB 3x10 OTB 3x10 OTB 3x12 OTB 3x12      Prone chicken wings     2x10 np today       DNF     2x10 2x10 2x10      Scalene stretch w//pillow case      :10x10 NP today      scap punches       7# 3x10                                                              Modalities              CP/ MHP PRN                                       -Patient taken through exercises by ANDREW KG from [id number]

## 2020-02-20 ENCOUNTER — OFFICE VISIT (OUTPATIENT)
Dept: PHYSICAL THERAPY | Facility: REHABILITATION | Age: 45
End: 2020-02-20
Payer: COMMERCIAL

## 2020-02-20 DIAGNOSIS — M25.512 ACUTE PAIN OF LEFT SHOULDER: Primary | ICD-10-CM

## 2020-02-20 DIAGNOSIS — M54.9 UPPER BACK PAIN ON LEFT SIDE: ICD-10-CM

## 2020-02-20 PROCEDURE — 97110 THERAPEUTIC EXERCISES: CPT | Performed by: PHYSICAL THERAPIST

## 2020-02-20 PROCEDURE — 97112 NEUROMUSCULAR REEDUCATION: CPT | Performed by: PHYSICAL THERAPIST

## 2020-02-20 PROCEDURE — 97140 MANUAL THERAPY 1/> REGIONS: CPT | Performed by: PHYSICAL THERAPIST

## 2020-02-20 NOTE — PROGRESS NOTES
Daily Note     Today's date: 2020  Patient name: Christy Gasca  : 1975  MRN: [id number]  Referring provider: Viky York MD  Dx:   Encounter Diagnosis     ICD-10-CM    1  Acute pain of left shoulder M25 512    2  Upper back pain on left side M54 9                   Subjective: Patient reports 2/10 left UT/shoulder pain but decreased incidence of left upper back pain  Patient reports noticeable improvement since IE  Objective: See treatment diary below       Assessment: Tolerated treatment well  Patient demonstrated fatigue post treatment  Progressed patient in terms of increased repetitions and resistance today with patient demonstrating increased fatigue post-treatment  Attempted increased resistance for horizontal abduction with patient unable to maintain proper form  Educated patient on monitoring muscle soreness after exercise progression  Plan: Continue per plan of care                   Precautions: HTN, arthritis, chronic pain   Access code:   * Indicates part of HEP     Daily Treatment Diary     Manual   2/3 2/6 2/10 2/13 2/17 2/20     P/A T-spine grade II-III  8' total 8' total  8' total  8'  8' 5'  8' total     CTJ inferior mob  Done  Done           IASTM w/ rhomboid stretch    Done  Done   np today                                     Exercise Diary   2/3 2/6 2/10 2/13 2/17 2/20     sidelying ER  2# 3x10 2# 3x10 ea 2# 3x10 2# 3x12 2#  3x12 3# 3x10 3#  3x10     sidelying flexion             Prone rows             Prone I*, T and Y 10 3x10 I,  2x10  T 3x10 I, T 3x10 I, T  3x12 I,T 3x15  I,T 2# I, 1# T 3x10 2# I, 1# T 3x10     Scapular retraction with ER* OTB 2x10 OTB 3x10 OTB 3x12 OTB 3x12 GTB 3x10 GTB 3x12 GTB 3x12 GTB  3x12     TB low rows  GTB 3x10, :03 GTB 3x10, :03 GTB 3x10, :03 GTB 3x12, :03   GTB  3x12  3" GTB 3x12, :03 BTB  3x10  :03     TB LPD             UBE retro  15W 5'  10W 5'  10W 5'  15W 5'  15W  6' 20W 5'  20W   5'     Chin tucks* 10 2x10 2x10 2x10 2x10 3x10 3x10 3x10     UT stretch  :10x10 :10x10 NP today         TB horizontal abduction    OTB 2x10 OTB 3x10 OTB 3x10 OTB   3x12 OTB 3x12 OTB  3x12     Prone chicken wings     2x10 np today       DNF     2x10 2x10 2x10 2x10     scap punches       7# 3x10                                                              Modalities              CP/ MHP PRN                                       -Patient taken through exercises by ANDREW LUNDY from [id number]

## 2020-02-24 ENCOUNTER — OFFICE VISIT (OUTPATIENT)
Dept: PHYSICAL THERAPY | Facility: REHABILITATION | Age: 45
End: 2020-02-24
Payer: COMMERCIAL

## 2020-02-24 DIAGNOSIS — M25.512 ACUTE PAIN OF LEFT SHOULDER: Primary | ICD-10-CM

## 2020-02-24 DIAGNOSIS — M54.9 UPPER BACK PAIN ON LEFT SIDE: ICD-10-CM

## 2020-02-24 PROCEDURE — 97112 NEUROMUSCULAR REEDUCATION: CPT | Performed by: PHYSICAL THERAPIST

## 2020-02-24 PROCEDURE — 97110 THERAPEUTIC EXERCISES: CPT | Performed by: PHYSICAL THERAPIST

## 2020-02-24 NOTE — PROGRESS NOTES
Daily Note     Today's date: 2020  Patient name: Rolan Morales  : 1975  MRN: [id number]  Referring provider: Barby Segundo MD  Dx:   Encounter Diagnosis     ICD-10-CM    1  Acute pain of left shoulder M25 512    2  Upper back pain on left side M54 9                   Subjective: Patient reports continued 2-3/10 pain in the posterior shoulder specifically with bench press (not during but after exercise ) Patient states he sees MD in April  Objective: See treatment diary below      Assessment: Tolerated treatment well  Patient would benefit from continued PT  Progressed therex in terms of increased repetitions today with patient tolerating well  Patient demonstrates moderate fatigue with all exercise indicating appropriate therapeutic dosage  Plan: Continue per plan of care                   Precautions: HTN, arthritis, chronic pain   Access code:   * Indicates part of HEP     Daily Treatment Diary     Manual  1/27 1/30 2/3 2/6 2/10 2/13 2/17 2/20 2/24    P/A T-spine grade II-III  8' total 8' total  8' total  8'  8' 5'  8' total 5'    CTJ inferior mob  Done  Done           IASTM w/ rhomboid stretch    Done  Done   np today                                     Exercise Diary   2/3 2/6 2/10 2/13 2/17 2/20 2/24    sidelying ER  2# 3x10 2# 3x10 ea 2# 3x10 2# 3x12 2#  3x12 3# 3x10 3#  3x10 3#  3x12    sidelying flexion             Prone rows             Prone I*, T and Y 10 3x10 I,  2x10  T 3x10 I, T 3x10 I, T  3x12 I,T 3x15  I,T 2# I, 1# T 3x10 2# I, 1# T 3x10 2# I, 1# T 3x10    Scapular retraction with ER* OTB 2x10 OTB 3x10 OTB 3x12 OTB 3x12 GTB 3x10 GTB 3x12 GTB 3x12 GTB  3x12 GTB 3x15    TB low rows  GTB 3x10, :03 GTB 3x10, :03 GTB 3x10, :03 GTB 3x12, :03   GTB  3x12  3" GTB 3x12, :03 BTB  3x10  :03 BTB  3x10  :03    TB LPD             UBE retro  15W 5'  10W 5'  10W 5'  15W 5'  15W  6' 20W 5'  20W   5' 20W   6'    Chin tucks* 10 2x10 2x10 2x10 2x10 3x10 3x10 3x10 3x10    UT stretch  :10x10 :10x10 NP today         TB horizontal abduction    OTB 2x10 OTB 3x10 OTB 3x10 OTB   3x12 OTB 3x12 OTB  3x12 OTB   3x15    Prone chicken wings     2x10 np today       DNF     2x10 2x10 2x10 2x10 2x10    scap punches       7# 3x10  7# 3x10    Supine thoracic ext stretch 2/1 pillow         3'                                               Modalities              CP/ MHP PRN                                       -Patient taken through exercises by PTA KG from [id number]

## 2020-02-26 DIAGNOSIS — I10 ESSENTIAL HYPERTENSION: ICD-10-CM

## 2020-02-26 RX ORDER — LISINOPRIL 10 MG/1
10 TABLET ORAL DAILY
Qty: 90 TABLET | Refills: 1 | Status: SHIPPED | OUTPATIENT
Start: 2020-02-26 | End: 2020-05-26 | Stop reason: SDUPTHER

## 2020-02-27 ENCOUNTER — OFFICE VISIT (OUTPATIENT)
Dept: PHYSICAL THERAPY | Facility: REHABILITATION | Age: 45
End: 2020-02-27
Payer: COMMERCIAL

## 2020-02-27 DIAGNOSIS — M54.9 UPPER BACK PAIN ON LEFT SIDE: ICD-10-CM

## 2020-02-27 DIAGNOSIS — M25.512 ACUTE PAIN OF LEFT SHOULDER: Primary | ICD-10-CM

## 2020-02-27 PROCEDURE — 97112 NEUROMUSCULAR REEDUCATION: CPT | Performed by: PHYSICAL THERAPIST

## 2020-02-27 PROCEDURE — 97110 THERAPEUTIC EXERCISES: CPT | Performed by: PHYSICAL THERAPIST

## 2020-02-27 NOTE — PROGRESS NOTES
Daily Note     Today's date: 2020  Patient name: Marion To  : 1975  MRN: [id number]  Referring provider: Deo Tan MD  Dx:   Encounter Diagnosis     ICD-10-CM    1  Acute pain of left shoulder M25 512    2  Upper back pain on left side M54 9                   Subjective: Patient states "Today I was pretty physical so the shoulder is a little flared up " Patient reports 2/10 pain in left UT and posterolateral shoulder  Objective: See treatment diary below      Assessment: Tolerated treatment well  Progressed TE in terms of increased repetitions today with patient tolerating well  Patient able to maintain good form throughout and demonstrates mild muscle fatigue with increased repetitions  Patient would benefit from continued PT  Plan: Continue per plan of care                   Precautions: HTN, arthritis, chronic pain   Access code:   * Indicates part of HEP     Daily Treatment Diary     Manual   2/3 2/6 2/10 2/13 2/17 2/20 2/24 2/27   P/A T-spine grade II-III  8' total 8' total  8' total  8'  8' 5'  8' total 5' 5'   CTJ inferior mob  Done  Done           IASTM w/ rhomboid stretch    Done  Done   np today                                     Exercise Diary   2/3 2/6 2/10 2/13 2/17 2/20 2/24 2/27   sidelying ER  2# 3x10 2# 3x10 ea 2# 3x10 2# 3x12 2#  3x12 3# 3x10 3#  3x10 3#  3x12 3#  3x12   sidelying flexion             Prone rows             Prone I*, T and Y 10 3x10 I,  2x10  T 3x10 I, T 3x10 I, T  3x12 I,T 3x15  I,T 2# I, 1# T 3x10 2# I, 1# T 3x10 2# I, 1# T 3x10 2# I 3x10,  2# T 3x12   Scapular retraction with ER* OTB 2x10 OTB 3x10 OTB 3x12 OTB 3x12 GTB 3x10 GTB 3x12 GTB 3x12 GTB  3x12 GTB 3x15 GTB  3x15   TB low rows  GTB 3x10, :03 GTB 3x10, :03 GTB 3x10, :03 GTB 3x12, :03   GTB  3x12  3" GTB 3x12, :03 BTB  3x10  :03 BTB  3x10  :03 BTB  3x12  :03   TB LPD             UBE retro  15W 5'  10W 5'  10W 5'  15W 5'  15W  6' 20W 5'  20W   5' 20W   6' 20W  5' Chin tucks* 10 2x10 2x10 2x10 2x10 3x10 3x10 3x10 3x10 5" 3x10   UT stretch  :10x10 :10x10 NP today         TB horizontal abduction    OTB 2x10 OTB 3x10 OTB 3x10 OTB   3x12 OTB 3x12 OTB  3x12 OTB   3x15 np today   Prone chicken wings     2x10 np today       DNF     2x10 2x10 2x10 2x10 2x10 2x10x5"   scap punches       7# 3x10  7# 3x10 7#  3x12   Supine thoracic ext stretch 2/1 pillow         3' np                                              Modalities              CP/ MHP PRN                                       -Patient taken through exercises by ANDREW KG from [id number]

## 2020-02-28 NOTE — PROGRESS NOTES
PT Re-Evaluation     Today's date: 2020  Patient name: Suad Lu  : 1975  MRN: [id number]  Referring provider: Narinder Cheema MD  Dx:   Encounter Diagnosis     ICD-10-CM    1  Acute pain of left shoulder M25 512    2  Upper back pain on left side M54 9        Start Time: 425  Stop Time: 0510  Total time in clinic (min): 45 minutes    Assessment  Assessment details: Suad Lu is a 45y o  year old male who presents to  with:   Acute pain of left shoulder  (primary encounter diagnosis)  Upper back pain on left side     Leanna Rubinstein has made the following improvements since beginning PT: decreased pain, increased ROM, increased strength and increased tolerance to activities   Leanna Rubinstein continues to present with the impairments as listed above  Patient would continue to benefit from skilled PT to address these deficits and to maximize function  Impairments: abnormal or restricted ROM, activity intolerance, impaired physical strength, lacks appropriate home exercise program, pain with function and scapular dyskinesis  Functional limitations: reaching, lifting, overhead activity, use of L UEBarriers to therapy: Chronic pain, HTN, back pain    Understanding of Dx/Px/POC: good   Prognosis: good    Goals  Short-term goals:   1  Patient's pain will be decreased to 5/10 at worse within 4 weeks- Met  2  Patient's strength will increase to 4+/5 within 4 weeks - Met    Long-term goals:   1  Patient will be able to perform lift L UE with minimal to no pain at time of discharge- Progressing   2  Patient will be able to perform perform IADL with minimal to no pain at time of discharge- Progressing   3  Patient will be independent in HEP at time of discharge  - Progressing       Plan  Plan details: Thank you for referring Suad Lu to Physical Therapy at [place] [address] and for the opportunity to coordinate care      Patient would benefit from: skilled PT  Planned modality interventions: electrical stimulation/Qatari stimulation, cryotherapy and unattended electrical stimulation  Planned therapy interventions: home exercise program, manual therapy, patient education, postural training, strengthening, stretching, therapeutic activities, therapeutic exercise, joint mobilization, IADL retraining and ADL training  Frequency: 2x week  Duration in visits: 10  Plan of Care beginning date: 2020  Treatment plan discussed with: patient and PTA        Subjective Evaluation    History of Present Illness  Mechanism of injury: Gamaliel Styles has been seen for total of 8 visits for OP PT for L shoulder pain   Patient rates overall improvement since beginning PT 60%  Patient's global rating of change is " Quite a bit better (5) " Patient reports improvements with overall pain frequency and intensity, noting "can definitely tell it's less, I'm not getting the pains shooting down my back and into the shoulder blade " Patient continues to report pain at this time into L shoulder and L UT at this time, noting "it's not as bad, but it's still there " Patient reporting pain following lifting and prolonged overhead activity  Patient given information for orthopedic to follow-up with L shoulder persistent pain  Quality of life: fair    Pain  Current pain ratin  At best pain ratin  At worst pain ratin  Location: L shoulder   Relieving factors: medications  Aggravating factors: lifting and overhead activity  Progression: no change    Treatments  Current treatment: physical therapy  Patient Goals  Patient goals for therapy: decreased pain, increased motion, increased strength and independence with ADLs/IADLs          Objective     Postural Observations    Additional Postural Observation Details  Patient demonstrates following posture: Increased thoracic kyphosis   Cervical protrusion  Rounded shoulders, increased IR     Palpation   Left   Hypertonic in the rhomboids and upper trapezius  Trigger point to upper trapezius  Tenderness     Left Shoulder   Tenderness in the biceps tendon (proximal) and bicipital groove  Neurological Testing     Sensation   Cervical/Thoracic   Left   Intact: light touch    Right   Intact: light touch    Additional Neurological Details  Patient denies N/T in R UE and L UE at this time  Sensation intact during IE  Active Range of Motion   Cervical/Thoracic Spine       Cervical    Flexion: 50 degrees   Extension: 40 degrees     with pain  Left lateral flexion: 20 degrees     with pain  Right lateral flexion: 20 degrees      Left rotation: 70 degrees  Right rotation: 60 degrees         Left Shoulder   Flexion: 160 degrees   Abduction: 160 degrees     Right Shoulder   Flexion: 160 degrees   Abduction: 160 degrees     Passive Range of Motion   Left Shoulder   Flexion: 165 degrees   Abduction: 165 degrees   External rotation 90°: 85 degrees   Internal rotation 90°: 70 degrees     Joint Play     Hypomobile: C7, T1, T2, T3, T4, T5, T6, T7, T8, T9 and T10     Pain: C7, T1, T2, T3, T4, T5, T6, T7, T8, T9 and T10     Strength/Myotome Testing     Left Shoulder     Planes of Motion   Flexion: 4+   Abduction: 4+   External rotation at 0°: 4   Internal rotation at 0°: 4+     Right Shoulder     Planes of Motion   Flexion: 4+   Abduction: 4+   External rotation at 0°: 4+   Internal rotation at 0°: 4+     Tests     Left Shoulder   Positive empty can, Neer's, painful arc and Yergason's

## 2020-03-10 ENCOUNTER — OFFICE VISIT (OUTPATIENT)
Dept: PHYSICAL THERAPY | Facility: REHABILITATION | Age: 45
End: 2020-03-10
Payer: COMMERCIAL

## 2020-03-10 DIAGNOSIS — M54.9 UPPER BACK PAIN ON LEFT SIDE: ICD-10-CM

## 2020-03-10 DIAGNOSIS — M25.512 ACUTE PAIN OF LEFT SHOULDER: Primary | ICD-10-CM

## 2020-03-10 PROCEDURE — 97110 THERAPEUTIC EXERCISES: CPT | Performed by: PHYSICAL THERAPIST

## 2020-03-10 PROCEDURE — 97112 NEUROMUSCULAR REEDUCATION: CPT | Performed by: PHYSICAL THERAPIST

## 2020-03-10 NOTE — PROGRESS NOTES
Daily Note     Today's date: 3/10/2020  Patient name: Ramo Ybarra  : 1975  MRN: [id number]  Referring provider: Kervin Slaughter MD  Dx:   Encounter Diagnosis     ICD-10-CM    1  Acute pain of left shoulder M25 512    2  Upper back pain on left side M54 9        Start Time: 1635  Stop Time: 1720  Total time in clinic (min): 45 minutes    Subjective: Caleb Cogan reports "doing okay, I see the orthopedic on the " upon arrival to therapy today  Objective: See treatment diary below      Assessment: Tolerated treatment fair  Patient demonstrated fatigue post treatment and would benefit from continued PT  Patient able to perform all exercises today, no increase in L shoulder pain with exercises performed today  Patient to be placed on hold until follow-up with orthopedic MD on 3/19/2020  Plan: Continue per plan of care  Progress treatment as tolerated                Precautions: HTN, arthritis, chronic pain   Access code:   * Indicates part of HEP     Daily Treatment Diary     Manual  3/10  2/3 2/6 2/10 2/13 2/17 2/20 2/24 2/27   P/A T-spine grade II-III   8' total  8' total  8'  8' 5'  8' total 5' 5'   CTJ inferior mob   Done           IASTM w/ rhomboid stretch    Done  Done   np today                                     Exercise Diary  3/10  2/3 2/6 2/10 2/13 2/17 2/20 2/24 2/27   sidelying ER 3# 3x10 ea  2# 3x10 ea 2# 3x10 2# 3x12 2#  3x12 3# 3x10 3#  3x10 3#  3x12 3#  3x12   sidelying flexion             Prone rows             Prone I*, T and Y 2# I, T 3x10 ea  3x10 I, T 3x10 I, T  3x12 I,T 3x15  I,T 2# I, 1# T 3x10 2# I, 1# T 3x10 2# I, 1# T 3x10 2# I 3x10,  2# T 3x12   Scapular retraction with ER*   OTB 3x12 OTB 3x12 GTB 3x10 GTB 3x12 GTB 3x12 GTB  3x12 GTB 3x15 GTB  3x15   TB low rows BTB 3x10  GTB 3x10, :03 GTB 3x10, :03 GTB 3x12, :03   GTB  3x12  3" GTB 3x12, :03 BTB  3x10  :03 BTB  3x10  :03 BTB  3x12  :03   TB LPD             UBE retro 20W 5'   10W 5'  10W 5'  15W 5'  15W  6' 20W 5' 20W   5' 20W   6' 20W  5'   Chin tucks* :[id number]  2x10 2x10 2x10 3x10 3x10 3x10 3x10 5" 3x10   UT stretch   :10x10 NP today         TB horizontal abduction  GTB 3x10  OTB 2x10 OTB 3x10 OTB 3x10 OTB   3x12 OTB 3x12 OTB  3x12 OTB   3x15 np today   Prone chicken wings     2x10 np today       DNF     2x10 2x10 2x10 2x10 2x10 2x10x5"   scap punches 7# 3x10      7# 3x10  7# 3x10 7#  3x12   Supine thoracic ext stretch 2/1 pillow NP today        3' np                                              Modalities              CP/ MHP PRN

## 2020-04-09 NOTE — PROGRESS NOTES
PT Discharge    Today's date: 2020  Patient name: Rafael Majano  : 1975  MRN: [id number]  Referring provider: Jarrett Moreno MD  Dx:   Encounter Diagnosis     ICD-10-CM    1  Acute pain of left shoulder M25 512    2  Upper back pain on left side M54 9        Start Time: 1635  Stop Time: 1720  Total time in clinic (min): 45 minutes    Assessment  Assessment details: Julianna Medina seen for OP PT for Acute pain of left shoulder  (primary encounter diagnosis)    Upper back pain on left side   for 11 visits with most recent visit on 3/10/2020  Though patient has made overall improvements, he has been referred to orthopedic MD at this time secondary to persistent pain in R shoulder  Patient given HEP throughout OP PT and is encouraged to contact PT with any questions or concerns in the future  No updated measurements taken, measurements in objective portion of discharge summary from RE performed on 2020  Subjective    Objective     Postural Observations    Additional Postural Observation Details  Patient demonstrates following posture: Increased thoracic kyphosis   Cervical protrusion  Rounded shoulders, increased IR     Palpation   Left   Hypertonic in the rhomboids and upper trapezius  Trigger point to upper trapezius  Tenderness     Left Shoulder   Tenderness in the biceps tendon (proximal) and bicipital groove  Neurological Testing     Sensation   Cervical/Thoracic   Left   Intact: light touch    Right   Intact: light touch    Additional Neurological Details  Patient denies N/T in R UE and L UE at this time  Sensation intact during IE        Active Range of Motion   Cervical/Thoracic Spine       Cervical    Flexion: 50 degrees   Extension: 40 degrees     with pain  Left lateral flexion: 20 degrees     with pain  Right lateral flexion: 20 degrees      Left rotation: 70 degrees  Right rotation: 60 degrees         Left Shoulder   Flexion: 160 degrees   Abduction: 160 degrees Right Shoulder   Flexion: 160 degrees   Abduction: 160 degrees     Passive Range of Motion   Left Shoulder   Flexion: 165 degrees   Abduction: 165 degrees   External rotation 90°: 85 degrees   Internal rotation 90°: 70 degrees     Joint Play     Hypomobile: C7, T1, T2, T3, T4, T5, T6, T7, T8, T9 and T10     Pain: C7, T1, T2, T3, T4, T5, T6, T7, T8, T9 and T10     Strength/Myotome Testing     Left Shoulder     Planes of Motion   Flexion: 4+   Abduction: 4+   External rotation at 0°: 4   Internal rotation at 0°: 4+     Right Shoulder     Planes of Motion   Flexion: 4+   Abduction: 4+   External rotation at 0°: 4+   Internal rotation at 0°: 4+     Tests     Left Shoulder   Positive empty can, Neer's, painful arc and Yergason's

## 2020-04-14 ENCOUNTER — TELEMEDICINE (OUTPATIENT)
Dept: FAMILY MEDICINE CLINIC | Facility: OTHER | Age: 45
End: 2020-04-14
Payer: COMMERCIAL

## 2020-04-14 VITALS
BODY MASS INDEX: 27.97 KG/M2 | DIASTOLIC BLOOD PRESSURE: 78 MMHG | HEIGHT: 73 IN | HEART RATE: 66 BPM | SYSTOLIC BLOOD PRESSURE: 128 MMHG

## 2020-04-14 DIAGNOSIS — I10 BENIGN ESSENTIAL HYPERTENSION: Primary | ICD-10-CM

## 2020-04-14 PROCEDURE — 99214 OFFICE O/P EST MOD 30 MIN: CPT | Performed by: FAMILY MEDICINE

## 2020-05-20 ENCOUNTER — APPOINTMENT (OUTPATIENT)
Dept: RADIOLOGY | Facility: OTHER | Age: 45
End: 2020-05-20
Payer: COMMERCIAL

## 2020-05-20 ENCOUNTER — OFFICE VISIT (OUTPATIENT)
Dept: OBGYN CLINIC | Facility: OTHER | Age: 45
End: 2020-05-20
Payer: COMMERCIAL

## 2020-05-20 VITALS
BODY MASS INDEX: 23.02 KG/M2 | HEART RATE: 52 BPM | WEIGHT: 195 LBS | SYSTOLIC BLOOD PRESSURE: 133 MMHG | DIASTOLIC BLOOD PRESSURE: 80 MMHG | HEIGHT: 77 IN

## 2020-05-20 DIAGNOSIS — M24.812 INTERNAL DERANGEMENT OF LEFT SHOULDER: Primary | ICD-10-CM

## 2020-05-20 DIAGNOSIS — M25.512 LEFT SHOULDER PAIN, UNSPECIFIED CHRONICITY: ICD-10-CM

## 2020-05-20 PROCEDURE — 73030 X-RAY EXAM OF SHOULDER: CPT

## 2020-05-20 PROCEDURE — 1036F TOBACCO NON-USER: CPT | Performed by: ORTHOPAEDIC SURGERY

## 2020-05-20 PROCEDURE — 3008F BODY MASS INDEX DOCD: CPT | Performed by: ORTHOPAEDIC SURGERY

## 2020-05-20 PROCEDURE — 3079F DIAST BP 80-89 MM HG: CPT | Performed by: ORTHOPAEDIC SURGERY

## 2020-05-20 PROCEDURE — 3075F SYST BP GE 130 - 139MM HG: CPT | Performed by: ORTHOPAEDIC SURGERY

## 2020-05-20 PROCEDURE — 99203 OFFICE O/P NEW LOW 30 MIN: CPT | Performed by: ORTHOPAEDIC SURGERY

## 2020-05-26 DIAGNOSIS — I10 ESSENTIAL HYPERTENSION: ICD-10-CM

## 2020-05-26 RX ORDER — LISINOPRIL 10 MG/1
10 TABLET ORAL DAILY
Qty: 90 TABLET | Refills: 1 | Status: SHIPPED | OUTPATIENT
Start: 2020-05-26 | End: 2020-08-24 | Stop reason: SDUPTHER

## 2020-06-15 ENCOUNTER — HOSPITAL ENCOUNTER (OUTPATIENT)
Dept: RADIOLOGY | Facility: HOSPITAL | Age: 45
Discharge: HOME | End: 2020-06-15
Attending: ORTHOPAEDIC SURGERY | Admitting: RADIOLOGY
Payer: COMMERCIAL

## 2020-06-15 ENCOUNTER — HOSPITAL ENCOUNTER (OUTPATIENT)
Dept: RADIOLOGY | Facility: HOSPITAL | Age: 45
Discharge: HOME | End: 2020-06-15
Payer: COMMERCIAL

## 2020-06-15 ENCOUNTER — HOSPITAL ENCOUNTER (OUTPATIENT)
Dept: MRI IMAGING | Facility: HOSPITAL | Age: 45
Discharge: HOME | End: 2020-06-15
Attending: ORTHOPAEDIC SURGERY
Payer: COMMERCIAL

## 2020-06-15 DIAGNOSIS — M24.812 INTERNAL DERANGEMENT OF LEFT SHOULDER: ICD-10-CM

## 2020-06-15 PROCEDURE — 73222 MRI JOINT UPR EXTREM W/DYE: CPT

## 2020-06-15 PROCEDURE — 20610 DRAIN/INJ JOINT/BURSA W/O US: CPT

## 2020-06-15 PROCEDURE — 77002 NEEDLE LOCALIZATION BY XRAY: CPT

## 2020-06-15 PROCEDURE — A9585 GADOBUTROL INJECTION: HCPCS | Performed by: ORTHOPAEDIC SURGERY

## 2020-06-15 RX ORDER — SODIUM CHLORIDE 9 MG/ML
50 INJECTION INTRAVENOUS
Status: COMPLETED | OUTPATIENT
Start: 2020-06-15 | End: 2020-06-15

## 2020-06-15 RX ORDER — LIDOCAINE HYDROCHLORIDE 10 MG/ML
10 INJECTION, SOLUTION EPIDURAL; INFILTRATION; INTRACAUDAL; PERINEURAL
Status: COMPLETED | OUTPATIENT
Start: 2020-06-15 | End: 2020-06-15

## 2020-06-15 RX ADMIN — SODIUM CHLORIDE 13 ML: 9 INJECTION, SOLUTION INTRAMUSCULAR; INTRAVENOUS; SUBCUTANEOUS at 11:07

## 2020-06-15 RX ADMIN — IOHEXOL 2 ML: 300 INJECTION, SOLUTION INTRAVENOUS at 11:07

## 2020-06-15 RX ADMIN — LIDOCAINE HYDROCHLORIDE 8 ML: 10 INJECTION, SOLUTION EPIDURAL; INFILTRATION; INTRACAUDAL; PERINEURAL at 11:06

## 2020-06-15 RX ADMIN — GADOBUTROL 0.2 ML: 604.72 INJECTION INTRAVENOUS at 12:54

## 2020-06-25 ENCOUNTER — OFFICE VISIT (OUTPATIENT)
Dept: OBGYN CLINIC | Facility: OTHER | Age: 45
End: 2020-06-25
Payer: COMMERCIAL

## 2020-06-25 VITALS
DIASTOLIC BLOOD PRESSURE: 70 MMHG | WEIGHT: 200 LBS | SYSTOLIC BLOOD PRESSURE: 123 MMHG | BODY MASS INDEX: 23.62 KG/M2 | HEIGHT: 77 IN | HEART RATE: 52 BPM

## 2020-06-25 DIAGNOSIS — M25.512 ACUTE PAIN OF LEFT SHOULDER: ICD-10-CM

## 2020-06-25 DIAGNOSIS — M75.42 IMPINGEMENT SYNDROME OF LEFT SHOULDER: Primary | ICD-10-CM

## 2020-06-25 PROCEDURE — 3074F SYST BP LT 130 MM HG: CPT | Performed by: ORTHOPAEDIC SURGERY

## 2020-06-25 PROCEDURE — 3078F DIAST BP <80 MM HG: CPT | Performed by: ORTHOPAEDIC SURGERY

## 2020-06-25 PROCEDURE — 3008F BODY MASS INDEX DOCD: CPT | Performed by: ORTHOPAEDIC SURGERY

## 2020-06-25 PROCEDURE — 99214 OFFICE O/P EST MOD 30 MIN: CPT | Performed by: ORTHOPAEDIC SURGERY

## 2020-06-25 PROCEDURE — 1036F TOBACCO NON-USER: CPT | Performed by: ORTHOPAEDIC SURGERY

## 2020-08-24 DIAGNOSIS — I10 ESSENTIAL HYPERTENSION: ICD-10-CM

## 2020-08-24 RX ORDER — LISINOPRIL 10 MG/1
10 TABLET ORAL DAILY
Qty: 90 TABLET | Refills: 1 | Status: SHIPPED | OUTPATIENT
Start: 2020-08-24 | End: 2020-11-24 | Stop reason: SDUPTHER

## 2020-10-19 ENCOUNTER — OFFICE VISIT (OUTPATIENT)
Dept: FAMILY MEDICINE CLINIC | Facility: OTHER | Age: 45
End: 2020-10-19
Payer: COMMERCIAL

## 2020-10-19 VITALS
HEIGHT: 77 IN | WEIGHT: 207.5 LBS | TEMPERATURE: 97.8 F | SYSTOLIC BLOOD PRESSURE: 124 MMHG | BODY MASS INDEX: 24.5 KG/M2 | HEART RATE: 63 BPM | DIASTOLIC BLOOD PRESSURE: 88 MMHG | OXYGEN SATURATION: 97 %

## 2020-10-19 DIAGNOSIS — Z00.00 ANNUAL PHYSICAL EXAM: ICD-10-CM

## 2020-10-19 DIAGNOSIS — Z11.4 SCREENING FOR HIV (HUMAN IMMUNODEFICIENCY VIRUS): Primary | ICD-10-CM

## 2020-10-19 DIAGNOSIS — R73.01 IMPAIRED FASTING GLUCOSE: ICD-10-CM

## 2020-10-19 DIAGNOSIS — I10 BENIGN ESSENTIAL HYPERTENSION: ICD-10-CM

## 2020-10-19 DIAGNOSIS — Z20.828 EXPOSURE TO SARS-ASSOCIATED CORONAVIRUS: ICD-10-CM

## 2020-10-19 PROCEDURE — 3079F DIAST BP 80-89 MM HG: CPT | Performed by: FAMILY MEDICINE

## 2020-10-19 PROCEDURE — 3074F SYST BP LT 130 MM HG: CPT | Performed by: FAMILY MEDICINE

## 2020-10-19 PROCEDURE — 99396 PREV VISIT EST AGE 40-64: CPT | Performed by: FAMILY MEDICINE

## 2020-10-19 PROCEDURE — 1036F TOBACCO NON-USER: CPT | Performed by: FAMILY MEDICINE

## 2020-10-19 PROCEDURE — 3725F SCREEN DEPRESSION PERFORMED: CPT | Performed by: FAMILY MEDICINE

## 2020-10-23 DIAGNOSIS — Z20.828 EXPOSURE TO SARS-ASSOCIATED CORONAVIRUS: ICD-10-CM

## 2020-10-23 PROCEDURE — U0003 INFECTIOUS AGENT DETECTION BY NUCLEIC ACID (DNA OR RNA); SEVERE ACUTE RESPIRATORY SYNDROME CORONAVIRUS 2 (SARS-COV-2) (CORONAVIRUS DISEASE [COVID-19]), AMPLIFIED PROBE TECHNIQUE, MAKING USE OF HIGH THROUGHPUT TECHNOLOGIES AS DESCRIBED BY CMS-2020-01-R: HCPCS | Performed by: FAMILY MEDICINE

## 2020-10-24 LAB — SARS-COV-2 RNA SPEC QL NAA+PROBE: NOT DETECTED

## 2020-11-24 DIAGNOSIS — I10 ESSENTIAL HYPERTENSION: ICD-10-CM

## 2020-11-24 RX ORDER — LISINOPRIL 10 MG/1
10 TABLET ORAL DAILY
Qty: 90 TABLET | Refills: 1 | Status: SHIPPED | OUTPATIENT
Start: 2020-11-24 | End: 2021-02-23 | Stop reason: SDUPTHER

## 2021-01-19 LAB
ALBUMIN SERPL-MCNC: 4.6 G/DL (ref 3.6–5.1)
ALBUMIN/GLOB SERPL: 2.1 (CALC) (ref 1–2.5)
ALP SERPL-CCNC: 54 U/L (ref 36–130)
ALT SERPL-CCNC: 30 U/L (ref 9–46)
AST SERPL-CCNC: 25 U/L (ref 10–40)
BILIRUB SERPL-MCNC: 0.7 MG/DL (ref 0.2–1.2)
BUN SERPL-MCNC: 17 MG/DL (ref 7–25)
BUN/CREAT SERPL: ABNORMAL (CALC) (ref 6–22)
CALCIUM SERPL-MCNC: 9.8 MG/DL (ref 8.6–10.3)
CHLORIDE SERPL-SCNC: 103 MMOL/L (ref 98–110)
CHOLEST SERPL-MCNC: 211 MG/DL
CHOLEST/HDLC SERPL: 3.5 (CALC)
CO2 SERPL-SCNC: 29 MMOL/L (ref 20–32)
CREAT SERPL-MCNC: 0.87 MG/DL (ref 0.6–1.35)
EST. AVERAGE GLUCOSE BLD GHB EST-MCNC: 114 (CALC)
EST. AVERAGE GLUCOSE BLD GHB EST-SCNC: 6.3 (CALC)
GLOBULIN SER CALC-MCNC: 2.2 G/DL (CALC) (ref 1.9–3.7)
GLUCOSE SERPL-MCNC: 106 MG/DL (ref 65–99)
HBA1C MFR BLD: 5.6 % OF TOTAL HGB
HDLC SERPL-MCNC: 60 MG/DL
HIV 1+2 AB+HIV1 P24 AG SERPL QL IA: NORMAL
LDLC SERPL CALC-MCNC: 130 MG/DL (CALC)
NONHDLC SERPL-MCNC: 151 MG/DL (CALC)
POTASSIUM SERPL-SCNC: 5.1 MMOL/L (ref 3.5–5.3)
PROT SERPL-MCNC: 6.8 G/DL (ref 6.1–8.1)
SL AMB EGFR AFRICAN AMERICAN: 123 ML/MIN/1.73M2
SL AMB EGFR NON AFRICAN AMERICAN: 106 ML/MIN/1.73M2
SODIUM SERPL-SCNC: 138 MMOL/L (ref 135–146)
TRIGL SERPL-MCNC: 107 MG/DL

## 2021-01-19 NOTE — RESULT ENCOUNTER NOTE
Please inform pt that labs are stable  Continue current medications and continue to engage in healthy lifestyle (diet, exercise)  Thanks!   Norris Shaikh, DO

## 2021-02-23 DIAGNOSIS — I10 ESSENTIAL HYPERTENSION: ICD-10-CM

## 2021-02-23 RX ORDER — LISINOPRIL 10 MG/1
10 TABLET ORAL DAILY
Qty: 90 TABLET | Refills: 0 | Status: SHIPPED | OUTPATIENT
Start: 2021-02-23 | End: 2021-05-28 | Stop reason: SDUPTHER

## 2021-03-10 DIAGNOSIS — Z23 ENCOUNTER FOR IMMUNIZATION: ICD-10-CM

## 2021-03-13 ENCOUNTER — IMMUNIZATIONS (OUTPATIENT)
Dept: FAMILY MEDICINE CLINIC | Facility: HOSPITAL | Age: 46
End: 2021-03-13

## 2021-03-13 DIAGNOSIS — Z23 ENCOUNTER FOR IMMUNIZATION: Primary | ICD-10-CM

## 2021-03-13 PROCEDURE — 91300 SARS-COV-2 / COVID-19 MRNA VACCINE (PFIZER-BIONTECH) 30 MCG: CPT

## 2021-03-13 PROCEDURE — 0001A SARS-COV-2 / COVID-19 MRNA VACCINE (PFIZER-BIONTECH) 30 MCG: CPT

## 2021-04-03 ENCOUNTER — IMMUNIZATIONS (OUTPATIENT)
Dept: FAMILY MEDICINE CLINIC | Facility: HOSPITAL | Age: 46
End: 2021-04-03

## 2021-04-03 DIAGNOSIS — Z23 ENCOUNTER FOR IMMUNIZATION: Primary | ICD-10-CM

## 2021-04-03 PROCEDURE — 91300 SARS-COV-2 / COVID-19 MRNA VACCINE (PFIZER-BIONTECH) 30 MCG: CPT

## 2021-04-03 PROCEDURE — 0002A SARS-COV-2 / COVID-19 MRNA VACCINE (PFIZER-BIONTECH) 30 MCG: CPT

## 2021-04-20 ENCOUNTER — OFFICE VISIT (OUTPATIENT)
Dept: FAMILY MEDICINE CLINIC | Facility: OTHER | Age: 46
End: 2021-04-20
Payer: COMMERCIAL

## 2021-04-20 VITALS
HEART RATE: 64 BPM | BODY MASS INDEX: 26.9 KG/M2 | RESPIRATION RATE: 16 BRPM | DIASTOLIC BLOOD PRESSURE: 78 MMHG | WEIGHT: 203 LBS | SYSTOLIC BLOOD PRESSURE: 120 MMHG | HEIGHT: 73 IN | OXYGEN SATURATION: 97 % | TEMPERATURE: 97.6 F

## 2021-04-20 DIAGNOSIS — I10 BENIGN ESSENTIAL HYPERTENSION: Primary | ICD-10-CM

## 2021-04-20 PROCEDURE — 3725F SCREEN DEPRESSION PERFORMED: CPT | Performed by: FAMILY MEDICINE

## 2021-04-20 PROCEDURE — 3078F DIAST BP <80 MM HG: CPT | Performed by: FAMILY MEDICINE

## 2021-04-20 PROCEDURE — 99213 OFFICE O/P EST LOW 20 MIN: CPT | Performed by: FAMILY MEDICINE

## 2021-04-20 PROCEDURE — 3008F BODY MASS INDEX DOCD: CPT | Performed by: FAMILY MEDICINE

## 2021-04-20 PROCEDURE — 1036F TOBACCO NON-USER: CPT | Performed by: FAMILY MEDICINE

## 2021-04-20 PROCEDURE — 3074F SYST BP LT 130 MM HG: CPT | Performed by: FAMILY MEDICINE

## 2021-04-20 NOTE — PROGRESS NOTES
Assessment/Plan:    No problem-specific Assessment & Plan notes found for this encounter  Diagnoses and all orders for this visit:    Benign essential hypertension  Comments:  BP goal <140/90  Continue lisinopril        BMI Counseling: Body mass index is 26 78 kg/m²  The BMI is above normal  Nutrition recommendations include decreasing portion sizes, encouraging healthy choices of fruits and vegetables, decreasing fast food intake, consuming healthier snacks, limiting drinks that contain sugar, moderation in carbohydrate intake, increasing intake of lean protein, reducing intake of saturated and trans fat and reducing intake of cholesterol  Exercise recommendations include moderate physical activity 150 minutes/week  Return in about 6 months (around 10/20/2021) for Annual physical     The patient indicates understanding of these issues and agrees with the plan  Subjective:      Patient ID: Jaqui York is a 39 y o  male  Hypertension  This is a chronic problem  The current episode started more than 1 year ago  The problem has been waxing and waning since onset  The problem is controlled  Pertinent negatives include no anxiety, blurred vision, chest pain, headaches, malaise/fatigue, neck pain, orthopnea, palpitations, peripheral edema, PND, shortness of breath or sweats  Agents associated with hypertension include NSAIDs  Risk factors for coronary artery disease include male gender, dyslipidemia and family history  Past treatments include ACE inhibitors  The current treatment provides moderate improvement  Compliance problems include diet, exercise and psychosocial issues  There is no history of angina, kidney disease, CAD/MI, CVA, heart failure, left ventricular hypertrophy, PVD or retinopathy  Identifiable causes of hypertension include sleep apnea  There is no history of chronic renal disease, a hypertension causing med or a thyroid problem         The following portions of the patient's history were reviewed and updated as appropriate: allergies, current medications, past family history, past medical history, past social history, past surgical history and problem list       Current Outpatient Medications:     lisinopril (ZESTRIL) 10 mg tablet, Take 1 tablet (10 mg total) by mouth daily, Disp: 90 tablet, Rfl: 0    Multiple Vitamins tablet, Take 1 tablet by mouth daily, Disp: , Rfl:     naproxen sodium (ALEVE) 220 MG tablet, Take by mouth, Disp: , Rfl:       Review of Systems   Constitutional: Negative for activity change, fatigue, fever and malaise/fatigue  HENT: Negative for congestion, ear pain, sinus pain and sore throat  Eyes: Negative for blurred vision, pain, itching and visual disturbance  Respiratory: Negative for cough and shortness of breath  Cardiovascular: Negative for chest pain, palpitations, orthopnea, leg swelling and PND  Gastrointestinal: Negative for abdominal pain, constipation, diarrhea, nausea and vomiting  Endocrine: Negative for cold intolerance and heat intolerance  Genitourinary: Negative for dysuria  Musculoskeletal: Negative for myalgias and neck pain  Skin: Negative for color change and rash  Neurological: Negative for dizziness, syncope and headaches  Hematological: Negative for adenopathy  Psychiatric/Behavioral: Negative for dysphoric mood  The patient is not nervous/anxious  Objective:      /78   Pulse 64   Temp 97 6 °F (36 4 °C)   Resp 16   Ht 6' 1" (1 854 m)   Wt 92 1 kg (203 lb)   SpO2 97%   BMI 26 78 kg/m²          Physical Exam  Vitals signs and nursing note reviewed  Constitutional:       General: He is not in acute distress  Appearance: Normal appearance  He is well-developed  He is not ill-appearing  HENT:      Head: Normocephalic and atraumatic  Eyes:      General: No scleral icterus  Conjunctiva/sclera: Conjunctivae normal       Pupils: Pupils are equal, round, and reactive to light     Neck: Musculoskeletal: Normal range of motion and neck supple  Cardiovascular:      Rate and Rhythm: Normal rate and regular rhythm  Heart sounds: Normal heart sounds  No murmur  Pulmonary:      Effort: Pulmonary effort is normal  No respiratory distress  Breath sounds: Normal breath sounds  Abdominal:      General: Bowel sounds are normal  There is no distension  Palpations: Abdomen is soft  Tenderness: There is no abdominal tenderness  Musculoskeletal: Normal range of motion  Right lower leg: No edema  Left lower leg: No edema  Lymphadenopathy:      Cervical: No cervical adenopathy  Skin:     General: Skin is warm and dry  Coloration: Skin is not jaundiced  Findings: No rash  Neurological:      General: No focal deficit present  Mental Status: He is alert and oriented to person, place, and time  Cranial Nerves: No cranial nerve deficit        Gait: Gait normal    Psychiatric:         Mood and Affect: Mood normal          Behavior: Behavior normal

## 2021-05-28 DIAGNOSIS — I10 ESSENTIAL HYPERTENSION: ICD-10-CM

## 2021-05-28 RX ORDER — LISINOPRIL 10 MG/1
10 TABLET ORAL DAILY
Qty: 90 TABLET | Refills: 0 | Status: SHIPPED | OUTPATIENT
Start: 2021-05-28 | End: 2021-09-01 | Stop reason: SDUPTHER

## 2021-09-01 DIAGNOSIS — I10 ESSENTIAL HYPERTENSION: ICD-10-CM

## 2021-09-01 RX ORDER — LISINOPRIL 10 MG/1
10 TABLET ORAL DAILY
Qty: 90 TABLET | Refills: 1 | Status: SHIPPED | OUTPATIENT
Start: 2021-09-01 | End: 2021-11-23 | Stop reason: SDUPTHER

## 2021-10-25 ENCOUNTER — OFFICE VISIT (OUTPATIENT)
Dept: FAMILY MEDICINE CLINIC | Facility: OTHER | Age: 46
End: 2021-10-25
Payer: COMMERCIAL

## 2021-10-25 VITALS
OXYGEN SATURATION: 98 % | WEIGHT: 208 LBS | HEIGHT: 73 IN | HEART RATE: 56 BPM | SYSTOLIC BLOOD PRESSURE: 130 MMHG | BODY MASS INDEX: 27.57 KG/M2 | RESPIRATION RATE: 18 BRPM | TEMPERATURE: 98.2 F | DIASTOLIC BLOOD PRESSURE: 75 MMHG

## 2021-10-25 DIAGNOSIS — I10 BENIGN ESSENTIAL HYPERTENSION: ICD-10-CM

## 2021-10-25 DIAGNOSIS — Z11.59 NEED FOR HEPATITIS C SCREENING TEST: ICD-10-CM

## 2021-10-25 DIAGNOSIS — Z00.00 ANNUAL PHYSICAL EXAM: Primary | ICD-10-CM

## 2021-10-25 DIAGNOSIS — R73.01 IMPAIRED FASTING GLUCOSE: ICD-10-CM

## 2021-10-25 DIAGNOSIS — R53.83 FATIGUE, UNSPECIFIED TYPE: ICD-10-CM

## 2021-10-25 PROCEDURE — 99396 PREV VISIT EST AGE 40-64: CPT | Performed by: FAMILY MEDICINE

## 2021-10-25 PROCEDURE — 3008F BODY MASS INDEX DOCD: CPT | Performed by: FAMILY MEDICINE

## 2021-10-25 PROCEDURE — 3725F SCREEN DEPRESSION PERFORMED: CPT | Performed by: FAMILY MEDICINE

## 2021-10-25 PROCEDURE — 1036F TOBACCO NON-USER: CPT | Performed by: FAMILY MEDICINE

## 2021-10-28 LAB
25(OH)D3 SERPL-MCNC: 33 NG/ML (ref 30–100)
ALBUMIN SERPL-MCNC: 4.6 G/DL (ref 3.6–5.1)
ALBUMIN/GLOB SERPL: 2.2 (CALC) (ref 1–2.5)
ALP SERPL-CCNC: 66 U/L (ref 36–130)
ALT SERPL-CCNC: 57 U/L (ref 9–46)
AST SERPL-CCNC: 34 U/L (ref 10–40)
BILIRUB SERPL-MCNC: 0.6 MG/DL (ref 0.2–1.2)
BUN SERPL-MCNC: 16 MG/DL (ref 7–25)
BUN/CREAT SERPL: ABNORMAL (CALC) (ref 6–22)
CALCIUM SERPL-MCNC: 9.8 MG/DL (ref 8.6–10.3)
CHLORIDE SERPL-SCNC: 99 MMOL/L (ref 98–110)
CHOLEST SERPL-MCNC: 239 MG/DL
CHOLEST/HDLC SERPL: 4.5 (CALC)
CO2 SERPL-SCNC: 28 MMOL/L (ref 20–32)
CREAT SERPL-MCNC: 1.05 MG/DL (ref 0.6–1.35)
ERYTHROCYTE [DISTWIDTH] IN BLOOD BY AUTOMATED COUNT: 12.7 % (ref 11–15)
EST. AVERAGE GLUCOSE BLD GHB EST-MCNC: 111 (CALC)
EST. AVERAGE GLUCOSE BLD GHB EST-SCNC: 6.2 (CALC)
GLOBULIN SER CALC-MCNC: 2.1 G/DL (CALC) (ref 1.9–3.7)
GLUCOSE SERPL-MCNC: 100 MG/DL (ref 65–99)
HBA1C MFR BLD: 5.5 % OF TOTAL HGB
HCT VFR BLD AUTO: 40.6 % (ref 38.5–50)
HCV AB S/CO SERPL IA: <0.02
HCV AB SERPL QL IA: NORMAL
HDLC SERPL-MCNC: 53 MG/DL
HGB BLD-MCNC: 13.6 G/DL (ref 13.2–17.1)
LDLC SERPL CALC-MCNC: 155 MG/DL (CALC)
MCH RBC QN AUTO: 30.3 PG (ref 27–33)
MCHC RBC AUTO-ENTMCNC: 33.5 G/DL (ref 32–36)
MCV RBC AUTO: 90.4 FL (ref 80–100)
NONHDLC SERPL-MCNC: 186 MG/DL (CALC)
PLATELET # BLD AUTO: 305 THOUSAND/UL (ref 140–400)
PMV BLD REES-ECKER: 9.5 FL (ref 7.5–12.5)
POTASSIUM SERPL-SCNC: 4.8 MMOL/L (ref 3.5–5.3)
PROT SERPL-MCNC: 6.7 G/DL (ref 6.1–8.1)
RBC # BLD AUTO: 4.49 MILLION/UL (ref 4.2–5.8)
SL AMB EGFR AFRICAN AMERICAN: 100 ML/MIN/1.73M2
SL AMB EGFR NON AFRICAN AMERICAN: 86 ML/MIN/1.73M2
SODIUM SERPL-SCNC: 134 MMOL/L (ref 135–146)
TRIGL SERPL-MCNC: 177 MG/DL
TSH SERPL-ACNC: 2.62 MIU/L (ref 0.4–4.5)
WBC # BLD AUTO: 7 THOUSAND/UL (ref 3.8–10.8)

## 2021-10-29 ENCOUNTER — TELEPHONE (OUTPATIENT)
Dept: FAMILY MEDICINE CLINIC | Facility: OTHER | Age: 46
End: 2021-10-29

## 2021-11-23 DIAGNOSIS — I10 ESSENTIAL HYPERTENSION: ICD-10-CM

## 2021-11-23 RX ORDER — LISINOPRIL 10 MG/1
10 TABLET ORAL DAILY
Qty: 90 TABLET | Refills: 0 | Status: SHIPPED | OUTPATIENT
Start: 2021-11-23 | End: 2022-03-02 | Stop reason: SDUPTHER

## 2022-02-23 DIAGNOSIS — I10 ESSENTIAL HYPERTENSION: ICD-10-CM

## 2022-02-23 RX ORDER — LISINOPRIL 10 MG/1
10 TABLET ORAL DAILY
Qty: 90 TABLET | Refills: 0 | Status: CANCELLED | OUTPATIENT
Start: 2022-02-23 | End: 2022-05-24

## 2022-02-24 RX ORDER — LISINOPRIL 10 MG/1
10 TABLET ORAL DAILY
Qty: 90 TABLET | Refills: 0 | Status: CANCELLED | OUTPATIENT
Start: 2022-02-24 | End: 2022-05-25

## 2022-03-02 DIAGNOSIS — I10 ESSENTIAL HYPERTENSION: ICD-10-CM

## 2022-03-02 RX ORDER — LISINOPRIL 10 MG/1
10 TABLET ORAL DAILY
Qty: 90 TABLET | Refills: 0 | Status: SHIPPED | OUTPATIENT
Start: 2022-03-02 | End: 2022-06-01 | Stop reason: SDUPTHER

## 2022-06-01 DIAGNOSIS — I10 ESSENTIAL HYPERTENSION: ICD-10-CM

## 2022-06-01 RX ORDER — LISINOPRIL 10 MG/1
10 TABLET ORAL DAILY
Qty: 90 TABLET | Refills: 0 | Status: SHIPPED | OUTPATIENT
Start: 2022-06-01 | End: 2022-08-30

## 2022-08-24 DIAGNOSIS — I10 ESSENTIAL HYPERTENSION: ICD-10-CM

## 2022-08-24 RX ORDER — LISINOPRIL 10 MG/1
10 TABLET ORAL DAILY
Qty: 90 TABLET | Refills: 0 | Status: SHIPPED | OUTPATIENT
Start: 2022-08-24 | End: 2022-11-22

## 2022-11-21 DIAGNOSIS — I10 ESSENTIAL HYPERTENSION: ICD-10-CM

## 2022-11-21 RX ORDER — LISINOPRIL 10 MG/1
10 TABLET ORAL DAILY
Qty: 90 TABLET | Refills: 0 | Status: SHIPPED | OUTPATIENT
Start: 2022-11-21 | End: 2023-02-19

## 2023-02-09 DIAGNOSIS — I10 ESSENTIAL HYPERTENSION: ICD-10-CM

## 2023-02-09 RX ORDER — LISINOPRIL 10 MG/1
10 TABLET ORAL DAILY
Qty: 90 TABLET | Refills: 0 | Status: SHIPPED | OUTPATIENT
Start: 2023-02-09 | End: 2023-05-10

## 2023-05-24 DIAGNOSIS — I10 ESSENTIAL HYPERTENSION: ICD-10-CM

## 2023-05-24 RX ORDER — LISINOPRIL 10 MG/1
10 TABLET ORAL DAILY
Qty: 30 TABLET | Refills: 0 | Status: SHIPPED | OUTPATIENT
Start: 2023-05-24

## 2023-06-01 ENCOUNTER — RA CDI HCC (OUTPATIENT)
Dept: OTHER | Facility: HOSPITAL | Age: 48
End: 2023-06-01

## 2023-06-01 NOTE — PROGRESS NOTES
[facility]  coding opportunities       Chart reviewed, no opportunity found: CHART REVIEWED, NO OPPORTUNITY FOUND        Patients Insurance        Commercial Insurance: [employer]over Hahn

## 2023-06-08 ENCOUNTER — OFFICE VISIT (OUTPATIENT)
Dept: FAMILY MEDICINE CLINIC | Facility: OTHER | Age: 48
End: 2023-06-08

## 2023-06-08 VITALS
WEIGHT: 198 LBS | RESPIRATION RATE: 15 BRPM | OXYGEN SATURATION: 98 % | TEMPERATURE: 98.4 F | HEIGHT: 73 IN | DIASTOLIC BLOOD PRESSURE: 76 MMHG | HEART RATE: 61 BPM | SYSTOLIC BLOOD PRESSURE: 138 MMHG | BODY MASS INDEX: 26.24 KG/M2

## 2023-06-08 DIAGNOSIS — Z12.11 SCREENING FOR COLORECTAL CANCER: ICD-10-CM

## 2023-06-08 DIAGNOSIS — I10 BENIGN ESSENTIAL HYPERTENSION: ICD-10-CM

## 2023-06-08 DIAGNOSIS — Z12.12 SCREENING FOR COLORECTAL CANCER: ICD-10-CM

## 2023-06-08 DIAGNOSIS — Z00.00 ANNUAL PHYSICAL EXAM: Primary | ICD-10-CM

## 2023-06-08 DIAGNOSIS — R73.01 IMPAIRED FASTING GLUCOSE: ICD-10-CM

## 2023-06-08 PROBLEM — R51.9 GENERALIZED HEADACHES: Status: RESOLVED | Noted: 2017-01-11 | Resolved: 2023-06-08

## 2023-06-08 NOTE — PROGRESS NOTES
[address] [address]    NAME: Misael Martinez  AGE: 41 y o  SEX: male  : 1975     DATE: 2023     Assessment and Plan:     Problem List Items Addressed This Visit        Cardiovascular and Mediastinum    Benign essential hypertension    Relevant Orders    Lipid panel    Hemoglobin A1C    Comprehensive metabolic panel   Other Visit Diagnoses     Annual physical exam    -  Primary    Screening for colorectal cancer        Relevant Orders    Ambulatory referral to Gastroenterology    Impaired fasting glucose        Relevant Orders    Hemoglobin A1C    Comprehensive metabolic panel          Immunizations and preventive care screenings were discussed with patient today  Appropriate education was printed on patient's after visit summary  Counseling:  Alcohol/drug use: discussed moderation in alcohol intake, the recommendations for healthy alcohol use, and avoidance of illicit drug use  Dental Health: discussed importance of regular tooth brushing, flossing, and dental visits  Injury prevention: discussed safety/seat belts, safety helmets, smoke detectors, carbon dioxide detectors, and smoking near bedding or upholstery  Sexual health: discussed sexually transmitted diseases, partner selection, use of condoms, avoidance of unintended pregnancy, and contraceptive alternatives  · Exercise: the importance of regular exercise/physical activity was discussed  Recommend exercise 3-5 times per week for at least 30 minutes  BMI Counseling: Body mass index is 26 12 kg/m²   The BMI is above normal  Nutrition recommendations include decreasing portion sizes, encouraging healthy choices of fruits and vegetables, decreasing fast food intake, consuming healthier snacks, limiting drinks that contain sugar, moderation in carbohydrate intake, increasing intake of lean protein, reducing intake of saturated and trans fat and reducing intake of cholesterol  Exercise recommendations include moderate physical activity 150 minutes/week  Rationale for BMI follow-up plan is due to patient being overweight or obese  Depression Screening and Follow-up Plan: Patient was screened for depression during today's encounter  They screened negative with a PHQ-2 score of 0  Return in about 1 year (around 6/8/2024) for Annual physical      Chief Complaint:     Chief Complaint   Patient presents with   • Physical Exam      History of Present Illness:     Adult Annual Physical   Patient here for a comprehensive physical exam  The patient reports no problems  Diet and Physical Activity  · Diet/Nutrition: well balanced diet, limited junk food and consuming 3-5 servings of fruits/vegetables daily  · Exercise: moderate cardiovascular exercise, 3-4 times a week on average and 30-60 minutes on average  Depression Screening  PHQ-2/9 Depression Screening    Little interest or pleasure in doing things: 0 - not at all  Feeling down, depressed, or hopeless: 0 - not at all  PHQ-2 Score: 0  PHQ-2 Interpretation: Negative depression screen       General Health  · Sleep: sleeps well and gets 7-8 hours of sleep on average  · Hearing: normal - bilateral   · Vision: goes for regular eye exams  · Dental: regular dental visits   Health  · Symptoms include: none     Review of Systems:     Review of Systems   Constitutional: Negative for appetite change, fatigue, fever and unexpected weight change  HENT: Negative for congestion, dental problem, ear pain, postnasal drip, sore throat and tinnitus  Eyes: Negative for pain, discharge and visual disturbance  Respiratory: Negative for cough, shortness of breath and wheezing  Cardiovascular: Negative for chest pain, palpitations and leg swelling  Gastrointestinal: Negative for abdominal pain, constipation, diarrhea, nausea and vomiting  Endocrine: Negative for cold intolerance and heat intolerance  Genitourinary: Negative for difficulty urinating, dysuria, flank pain and urgency  Musculoskeletal: Negative for arthralgias, back pain, joint swelling and myalgias  Skin: Negative for rash and wound  Allergic/Immunologic: Negative for immunocompromised state  Neurological: Negative for dizziness, syncope, speech difficulty, weakness and numbness  Hematological: Negative for adenopathy  Does not bruise/bleed easily  Psychiatric/Behavioral: Negative for confusion, dysphoric mood and sleep disturbance  The patient is not nervous/anxious         Past Medical History:     Past Medical History:   Diagnosis Date   • Anxiety    • Colon, diverticulosis     Last assessed 06/10/12   • Epidermal inclusion cyst     Last assessed 05/17/13   • Laceration of thumb, left 3/2/2017   • Lyme disease    • Multinodular goiter     Subcentimer thyroid nodules bilateral on u/s 2008 09,10   • Onychomycosis     Last assessed 01/19/15   • Rhinitis medicamentosa     Last assessed 07/24/13   • Vertigo     Last assessed 12/06/13      Past Surgical History:     Past Surgical History:   Procedure Laterality Date   • COLONOSCOPY  08/2011    Diverticulious Dr Divine Ford   • FL INJECTION LEFT SHOULDER (ARTHROGRAM)  6/15/2020   • OTHER SURGICAL HISTORY Right     Fixation of fracture of one metacarpal bone - 5th ([employer])   • SEPTOPLASTY  12/2013      Family History:     Family History   Problem Relation Age of Onset   • No Known Problems Mother    • No Known Problems Father       Social History:     Social History     Socioeconomic History   • Marital status: /Civil Union     Spouse name: None   • Number of children: None   • Years of education: Completed associates degree   • Highest education level: None   Occupational History   • 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   Tobacco Use   • Smoking status: Former     Packs/day: 1 00     Years: 9 00     Total pack years: 9 00     Types: Cigarettes     Quit date: 2008     Years "since quitting: 15 4   • Smokeless tobacco: Former   Substance and Sexual Activity   • Alcohol use: Yes     Comment: social   • Drug use: No   • Sexual activity: Yes     Partners: Female   Other Topics Concern   • None   Social History Narrative   • None     Social Determinants of Health     Financial Resource Strain: Not on file   Food Insecurity: Not on file   Transportation Needs: Not on file   Physical Activity: Not on file   Stress: Not on file   Social Connections: Not on file   Intimate Partner Violence: Not on file   Housing Stability: Not on file      Current Medications:     Current Outpatient Medications   Medication Sig Dispense Refill   • lisinopril (ZESTRIL) 10 mg tablet Take 1 tablet (10 mg total) by mouth daily 30 tablet 0   • Multiple Vitamins tablet Take 1 tablet by mouth daily     • naproxen sodium (ALEVE) 220 MG tablet Take by mouth       No current facility-administered medications for this visit  Allergies: Allergies   Allergen Reactions   • Penicillins Rash      Physical Exam:     /76   Pulse 61   Temp 98 4 °F (36 9 °C)   Resp 15   Ht 6' 1\" (1 854 m)   Wt 89 8 kg (198 lb)   SpO2 98%   BMI 26 12 kg/m²     Physical Exam  Vitals and nursing note reviewed  Constitutional:       General: He is not in acute distress  Appearance: Normal appearance  He is well-developed  He is not ill-appearing  Comments: Body mass index is 26 12 kg/m²  HENT:      Head: Normocephalic and atraumatic  Right Ear: Hearing, tympanic membrane, ear canal and external ear normal       Left Ear: Hearing, tympanic membrane, ear canal and external ear normal       Nose: Nose normal       Mouth/Throat:      Pharynx: Uvula midline  Eyes:      General: No scleral icterus  Conjunctiva/sclera: Conjunctivae normal       Pupils: Pupils are equal, round, and reactive to light  Neck:      Thyroid: No thyromegaly  Vascular: No JVD     Cardiovascular:      Rate and Rhythm: Normal rate " and regular rhythm  Heart sounds: Normal heart sounds  No murmur heard  Pulmonary:      Effort: Pulmonary effort is normal  No respiratory distress  Breath sounds: Normal breath sounds  Abdominal:      General: Bowel sounds are normal  There is no distension  Palpations: Abdomen is soft  There is no mass  Tenderness: There is no abdominal tenderness  Musculoskeletal:         General: No tenderness  Normal range of motion  Cervical back: Normal range of motion and neck supple  Right lower leg: No edema  Left lower leg: No edema  Lymphadenopathy:      Cervical: No cervical adenopathy  Skin:     General: Skin is warm and dry  Capillary Refill: Capillary refill takes less than 2 seconds  Coloration: Skin is not jaundiced  Findings: No rash  Neurological:      General: No focal deficit present  Mental Status: He is alert and oriented to person, place, and time  Cranial Nerves: No cranial nerve deficit     Psychiatric:         Mood and Affect: Mood normal          Behavior: Behavior normal           Clare Barrera, DO  [facility]

## 2023-06-16 LAB
ALBUMIN SERPL-MCNC: 4.5 G/DL (ref 3.6–5.1)
ALBUMIN/GLOB SERPL: 2.1 (CALC) (ref 1–2.5)
ALP SERPL-CCNC: 63 U/L (ref 36–130)
ALT SERPL-CCNC: 32 U/L (ref 9–46)
AST SERPL-CCNC: 28 U/L (ref 10–40)
BILIRUB SERPL-MCNC: 0.8 MG/DL (ref 0.2–1.2)
BUN SERPL-MCNC: 25 MG/DL (ref 7–25)
BUN/CREAT SERPL: NORMAL (CALC) (ref 6–22)
CALCIUM SERPL-MCNC: 9.7 MG/DL (ref 8.6–10.3)
CHLORIDE SERPL-SCNC: 103 MMOL/L (ref 98–110)
CHOLEST SERPL-MCNC: 207 MG/DL
CHOLEST/HDLC SERPL: 3.2 (CALC)
CO2 SERPL-SCNC: 24 MMOL/L (ref 20–32)
CREAT SERPL-MCNC: 1.1 MG/DL (ref 0.6–1.29)
GFR/BSA.PRED SERPLBLD CYS-BASED-ARV: 84 ML/MIN/1.73M2
GLOBULIN SER CALC-MCNC: 2.1 G/DL (CALC) (ref 1.9–3.7)
GLUCOSE SERPL-MCNC: 95 MG/DL (ref 65–99)
HBA1C MFR BLD: 5.4 % OF TOTAL HGB
HDLC SERPL-MCNC: 64 MG/DL
LDLC SERPL CALC-MCNC: 128 MG/DL (CALC)
NONHDLC SERPL-MCNC: 143 MG/DL (CALC)
POTASSIUM SERPL-SCNC: 4.4 MMOL/L (ref 3.5–5.3)
PROT SERPL-MCNC: 6.6 G/DL (ref 6.1–8.1)
SODIUM SERPL-SCNC: 136 MMOL/L (ref 135–146)
TRIGL SERPL-MCNC: 61 MG/DL

## 2023-06-16 NOTE — RESULT ENCOUNTER NOTE
Please inform pt that labs are stable  Continue current medications and continue to engage in healthy lifestyle (diet, exercise)  Thanks!   Ananth Keene

## 2023-06-23 DIAGNOSIS — I10 ESSENTIAL HYPERTENSION: ICD-10-CM

## 2023-06-23 RX ORDER — LISINOPRIL 10 MG/1
10 TABLET ORAL DAILY
Qty: 30 TABLET | Refills: 0 | Status: SHIPPED | OUTPATIENT
Start: 2023-06-23

## 2023-07-25 DIAGNOSIS — I10 ESSENTIAL HYPERTENSION: ICD-10-CM

## 2023-07-25 NOTE — TELEPHONE ENCOUNTER
Hi, this is Alfie Bermudez. My birthday is September 27th, 1975. I'm calling for a prescription refill of lisinopril. I think the last refill I got was 30 days and I usually get 90. So I'm requesting if I can get it another 90 day refill. If you have any questions, please feel free to give me a call back [phone]. Thank you. Is 90 day supply ok? Please advise. Thank you.

## 2023-07-26 RX ORDER — LISINOPRIL 10 MG/1
10 TABLET ORAL DAILY
Qty: 90 TABLET | Refills: 1 | Status: SHIPPED | OUTPATIENT
Start: 2023-07-26

## 2023-09-08 ENCOUNTER — OFFICE VISIT (OUTPATIENT)
Dept: GASTROENTEROLOGY | Facility: AMBULARY SURGERY CENTER | Age: 48
End: 2023-09-08
Payer: COMMERCIAL

## 2023-09-08 VITALS
HEART RATE: 54 BPM | WEIGHT: 201.4 LBS | OXYGEN SATURATION: 96 % | BODY MASS INDEX: 26.69 KG/M2 | DIASTOLIC BLOOD PRESSURE: 76 MMHG | HEIGHT: 73 IN | SYSTOLIC BLOOD PRESSURE: 132 MMHG

## 2023-09-08 DIAGNOSIS — Z12.11 SCREENING FOR COLORECTAL CANCER: ICD-10-CM

## 2023-09-08 DIAGNOSIS — Z12.12 SCREENING FOR COLORECTAL CANCER: ICD-10-CM

## 2023-09-08 PROCEDURE — 99204 OFFICE O/P NEW MOD 45 MIN: CPT | Performed by: INTERNAL MEDICINE

## 2023-09-08 NOTE — PROGRESS NOTES
Consultation -  [facility]  Mally Ledezma 41 y.o. male MRN: [id number]          Assessment & Plan:  Marychuy 53-year-old gentleman here for colon cancer screening, remote history of diverticulitis. 1.  Colon cancer screening: Patient is of average risk  -We will schedule patient for colonoscopy  -Discussed with him risks of procedure including bleeding, surgery, perforation, missed polyp reduction rate    2. History of diverticular disease:  -Recommend high-fiber diet  -Advised patient to minimize NSAID use    Li Negron was seen today for screening colonoscopy. Diagnoses and all orders for this visit:    Screening for colorectal cancer  -     Ambulatory referral to Gastroenterology  -     Colonoscopy; Future    Other orders  -     Diet NPO; Sips with meds; Standing  -     Void on call to OR; Standing            _____________________________________________________________        CC: Colon cancer screening    HPI:  Mally Ledezma is a 41 y.o.male who was referred for evaluation of colon cancer screening. This is a pleasant 53-year-old gentleman with a history of hypertension, reports a remote history of diverticulitis, has mild f flareups 1-2 times per year which he treats with clear liquid diet and tends to resolve. Generally has regular bowel moods. Denies any nausea, vomiting, heartburn, dysphagia, diarrhea, constipation, melena, rectal bleeding. Reportedly had a colonoscopy about 10 years ago with the first bout of diverticulitis. Medical history notable for hypertension. Surgical history is notable for hand surgery. Quit smoking. Drinks about 2-3 drinks per week. 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 Family history is negative for GI or associated malignancies. He does take Aleve nightly. ROS:  The remainder of the ROS was negative except for the pertinent positives mentioned in HPI.          Allergies: Penicillins    Medications:   Current Outpatient Medications:   •  lisinopril (ZESTRIL) 10 mg tablet, Take 1 tablet (10 mg total) by mouth daily, Disp: 90 tablet, Rfl: 1  •  Multiple Vitamins tablet, Take 1 tablet by mouth daily, Disp: , Rfl:   •  naproxen sodium (ALEVE) 220 MG tablet, Take by mouth, Disp: , Rfl: '    Past Medical History:   Diagnosis Date   • Anxiety    • Colon, diverticulosis     Last assessed 06/10/12   • Epidermal inclusion cyst     Last assessed 05/17/13   • Laceration of thumb, left 3/2/2017   • Lyme disease    • Multinodular goiter     Subcentimer thyroid nodules bilateral on u/s 2008 09,10   • Onychomycosis     Last assessed 01/19/15   • Rhinitis medicamentosa     Last assessed 07/24/13   • Vertigo     Last assessed 12/06/13       Past Surgical History:   Procedure Laterality Date   • COLONOSCOPY  08/2011    Diverticulious Dr. Terry Malik   • FL INJECTION LEFT SHOULDER (ARTHROGRAM)  6/15/2020   • OTHER SURGICAL HISTORY Right     Fixation of fracture of one metacarpal bone - 5th ([employer])   • SEPTOPLASTY  12/2013       Family History   Problem Relation Age of Onset   • No Known Problems Mother    • No Known Problems Father         reports that he quit smoking about 15 years ago. His smoking use included cigarettes. He has a 9.00 pack-year smoking history. He has quit using smokeless tobacco. He reports current alcohol use. He reports that he does not use drugs.           Physical Exam:     /76 (BP Location: Right arm, Patient Position: Sitting, Cuff Size: Standard)   Pulse (!) 54   Ht 6' 1" (1.854 m)   Wt 91.4 kg (201 lb 6.4 oz)   SpO2 96%   BMI 26.57 kg/m²     Gen: wn/wd, NAD, healthy-appearing gentleman  HEENT: anicteric, MMM, no cervical LAD  CVS: RRR, no m/r/g  CHEST: CTA b/l  ABD: +BS, soft, NT,ND, no hepatosplenomegaly  EXT: no c/c/e  NEURO: aaox3  SKIN: NO rashes

## 2023-09-08 NOTE — LETTER
September 8, 2023     Rakesh Martinez    Patient: Osmany Kendrick   YOB: 1975   Date of Visit: 9/8/2023       Dear Dr. Capo Fortune:    Thank you for referring Felecia Barfield to me for evaluation. Below are my notes for this consultation. If you have questions, please do not hesitate to call me. I look forward to following your patient along with you. Sincerely,        Baljeet Byers MD        CC: No Recipients    Baljeet Byers MD  9/8/2023  8:47 AM  Sign when Signing Visit  Consultation - [phone]  [facility]  Osmany Kendrick 41 y.o. male MRN: [id number]          Assessment & Plan:  Pleasant 47-year-old gentleman here for colon cancer screening, remote history of diverticulitis. 1.  Colon cancer screening: Patient is of average risk  -We will schedule patient for colonoscopy  -Discussed with him risks of procedure including bleeding, surgery, perforation, missed polyp reduction rate    2. History of diverticular disease:  -Recommend high-fiber diet  -Advised patient to minimize NSAID use    Margi Sinha was seen today for screening colonoscopy. Diagnoses and all orders for this visit:    Screening for colorectal cancer  -     Ambulatory referral to Gastroenterology  -     Colonoscopy; Future    Other orders  -     Diet NPO; Sips with meds; Standing  -     Void on call to OR; Standing            _____________________________________________________________        CC: Colon cancer screening    HPI:  Osmany Kendrick is a 41 y.o.male who was referred for evaluation of colon cancer screening. This is a pleasant 47-year-old gentleman with a history of hypertension, reports a remote history of diverticulitis, has mild f flareups 1-2 times per year which he treats with clear liquid diet and tends to resolve. Generally has regular bowel moods.   Denies any nausea, vomiting, heartburn, dysphagia, diarrhea, constipation, melena, rectal bleeding. Reportedly had a colonoscopy about 10 years ago with the first bout of diverticulitis. Medical history notable for hypertension. Surgical history is notable for hand surgery. Quit smoking. Drinks about 2-3 drinks per week. 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 Family history is negative for GI or associated malignancies. He does take Aleve nightly. ROS:  The remainder of the ROS was negative except for the pertinent positives mentioned in HPI. Allergies: Penicillins    Medications:   Current Outpatient Medications:   •  lisinopril (ZESTRIL) 10 mg tablet, Take 1 tablet (10 mg total) by mouth daily, Disp: 90 tablet, Rfl: 1  •  Multiple Vitamins tablet, Take 1 tablet by mouth daily, Disp: , Rfl:   •  naproxen sodium (ALEVE) 220 MG tablet, Take by mouth, Disp: , Rfl: '    Past Medical History:   Diagnosis Date   • Anxiety    • Colon, diverticulosis     Last assessed 06/10/12   • Epidermal inclusion cyst     Last assessed 05/17/13   • Laceration of thumb, left 3/2/2017   • Lyme disease    • Multinodular goiter     Subcentimer thyroid nodules bilateral on u/s 2008 09,10   • Onychomycosis     Last assessed 01/19/15   • Rhinitis medicamentosa     Last assessed 07/24/13   • Vertigo     Last assessed 12/06/13       Past Surgical History:   Procedure Laterality Date   • COLONOSCOPY  08/2011    Diverticulious Dr. Mendy Hargrove   • FL INJECTION LEFT SHOULDER (ARTHROGRAM)  6/15/2020   • OTHER SURGICAL HISTORY Right     Fixation of fracture of one metacarpal bone - 5th ([employer])   • SEPTOPLASTY  12/2013       Family History   Problem Relation Age of Onset   • No Known Problems Mother    • No Known Problems Father         reports that he quit smoking about 15 years ago. His smoking use included cigarettes. He has a 9.00 pack-year smoking history. He has quit using smokeless tobacco. He reports current alcohol use. He reports that he does not use drugs.           Physical Exam:     BP 132/76 (BP Location: Right arm, Patient Position: Sitting, Cuff Size: Standard)   Pulse (!) 54   Ht 6' 1" (1.854 m)   Wt 91.4 kg (201 lb 6.4 oz)   SpO2 96%   BMI 26.57 kg/m²     Gen: wn/wd, NAD, healthy-appearing gentleman  HEENT: anicteric, MMM, no cervical LAD  CVS: RRR, no m/r/g  CHEST: CTA b/l  ABD: +BS, soft, NT,ND, no hepatosplenomegaly  EXT: no c/c/e  NEURO: aaox3  SKIN: NO rashes

## 2023-10-05 ENCOUNTER — ANESTHESIA EVENT (OUTPATIENT)
Dept: ANESTHESIOLOGY | Facility: HOSPITAL | Age: 48
End: 2023-10-05

## 2023-10-05 ENCOUNTER — ANESTHESIA (OUTPATIENT)
Dept: ANESTHESIOLOGY | Facility: HOSPITAL | Age: 48
End: 2023-10-05

## 2023-10-05 ENCOUNTER — HOSPITAL ENCOUNTER (OUTPATIENT)
Dept: GASTROENTEROLOGY | Facility: AMBULARY SURGERY CENTER | Age: 48
Setting detail: OUTPATIENT SURGERY
End: 2023-10-05
Attending: INTERNAL MEDICINE
Payer: COMMERCIAL

## 2023-10-05 ENCOUNTER — ANESTHESIA EVENT (OUTPATIENT)
Dept: GASTROENTEROLOGY | Facility: AMBULARY SURGERY CENTER | Age: 48
End: 2023-10-05

## 2023-10-05 ENCOUNTER — ANESTHESIA (OUTPATIENT)
Dept: GASTROENTEROLOGY | Facility: AMBULARY SURGERY CENTER | Age: 48
End: 2023-10-05

## 2023-10-05 VITALS
OXYGEN SATURATION: 97 % | RESPIRATION RATE: 18 BRPM | BODY MASS INDEX: 26.64 KG/M2 | SYSTOLIC BLOOD PRESSURE: 115 MMHG | HEIGHT: 73 IN | HEART RATE: 55 BPM | WEIGHT: 201 LBS | TEMPERATURE: 96.3 F | DIASTOLIC BLOOD PRESSURE: 71 MMHG

## 2023-10-05 DIAGNOSIS — Z12.11 SCREENING FOR COLORECTAL CANCER: ICD-10-CM

## 2023-10-05 DIAGNOSIS — Z12.12 SCREENING FOR COLORECTAL CANCER: ICD-10-CM

## 2023-10-05 PROCEDURE — 88305 TISSUE EXAM BY PATHOLOGIST: CPT | Performed by: PATHOLOGY

## 2023-10-05 RX ORDER — SODIUM CHLORIDE, SODIUM LACTATE, POTASSIUM CHLORIDE, CALCIUM CHLORIDE 600; 310; 30; 20 MG/100ML; MG/100ML; MG/100ML; MG/100ML
INJECTION, SOLUTION INTRAVENOUS CONTINUOUS PRN
Status: DISCONTINUED | OUTPATIENT
Start: 2023-10-05 | End: 2023-10-05

## 2023-10-05 RX ORDER — SODIUM CHLORIDE, SODIUM LACTATE, POTASSIUM CHLORIDE, CALCIUM CHLORIDE 600; 310; 30; 20 MG/100ML; MG/100ML; MG/100ML; MG/100ML
125 INJECTION, SOLUTION INTRAVENOUS CONTINUOUS
Status: DISPENSED | OUTPATIENT
Start: 2023-10-05

## 2023-10-05 RX ORDER — PROPOFOL 10 MG/ML
INJECTION, EMULSION INTRAVENOUS AS NEEDED
Status: DISCONTINUED | OUTPATIENT
Start: 2023-10-05 | End: 2023-10-05

## 2023-10-05 RX ORDER — PROPOFOL 10 MG/ML
INJECTION, EMULSION INTRAVENOUS CONTINUOUS PRN
Status: DISCONTINUED | OUTPATIENT
Start: 2023-10-05 | End: 2023-10-05

## 2023-10-05 RX ORDER — LIDOCAINE HYDROCHLORIDE 10 MG/ML
INJECTION, SOLUTION EPIDURAL; INFILTRATION; INTRACAUDAL; PERINEURAL AS NEEDED
Status: DISCONTINUED | OUTPATIENT
Start: 2023-10-05 | End: 2023-10-05

## 2023-10-05 RX ORDER — SODIUM CHLORIDE, SODIUM LACTATE, POTASSIUM CHLORIDE, CALCIUM CHLORIDE 600; 310; 30; 20 MG/100ML; MG/100ML; MG/100ML; MG/100ML
125 INJECTION, SOLUTION INTRAVENOUS CONTINUOUS
Status: CANCELLED | OUTPATIENT
Start: 2023-10-05

## 2023-10-05 RX ADMIN — PROPOFOL 140 MG: 10 INJECTION, EMULSION INTRAVENOUS at 12:54

## 2023-10-05 RX ADMIN — SODIUM CHLORIDE, SODIUM LACTATE, POTASSIUM CHLORIDE, AND CALCIUM CHLORIDE 125 ML/HR: .6; .31; .03; .02 INJECTION, SOLUTION INTRAVENOUS at 12:12

## 2023-10-05 RX ADMIN — PROPOFOL 120 MCG/KG/MIN: 10 INJECTION, EMULSION INTRAVENOUS at 12:54

## 2023-10-05 RX ADMIN — SODIUM CHLORIDE, SODIUM LACTATE, POTASSIUM CHLORIDE, AND CALCIUM CHLORIDE: .6; .31; .03; .02 INJECTION, SOLUTION INTRAVENOUS at 12:49

## 2023-10-05 RX ADMIN — LIDOCAINE HYDROCHLORIDE 50 MG: 10 INJECTION, SOLUTION EPIDURAL; INFILTRATION; INTRACAUDAL; PERINEURAL at 12:54

## 2023-10-05 RX ADMIN — PROPOFOL 40 MG: 10 INJECTION, EMULSION INTRAVENOUS at 12:59

## 2023-10-05 NOTE — ANESTHESIA PREPROCEDURE EVALUATION
Procedure:  COLONOSCOPY    Relevant Problems   CARDIO   (+) Benign essential hypertension      PULMONARY   (+) NORMAN (obstructive sleep apnea)        Physical Exam    Airway    Mallampati score: II  TM Distance: >3 FB  Neck ROM: full     Dental   Comment: Denies loose teeth,     Cardiovascular  Cardiovascular exam normal    Pulmonary  Pulmonary exam normal     Other Findings  Portions of exam deferred due to low yield and/or unknown COVID status      Anesthesia Plan  ASA Score- 2     Anesthesia Type- IV sedation with anesthesia with ASA Monitors. Additional Monitors:   Airway Plan:           Plan Factors-Exercise tolerance (METS): >4 METS. Chart reviewed. Existing labs reviewed. Patient summary reviewed. Patient is not a current smoker. Induction- intravenous. Postoperative Plan-     Informed Consent- Anesthetic plan and risks discussed with patient. I personally reviewed this patient with the CRNA. Discussed and agreed on the Anesthesia Plan with the CRNA. Allen Calero

## 2023-10-05 NOTE — H&P
History and Physical -  [facility]  Marylee Fothergill 57 y.o. male MRN: [id number]    HPI: Marylee Fothergill is a 57y.o. year old male who presents with screening colonoscopy.        Review of Systems    Historical Information   Past Medical History:   Diagnosis Date   • Anxiety    • Colon, diverticulosis     Last assessed 06/10/12   • Epidermal inclusion cyst     Last assessed 05/17/13   • Laceration of thumb, left 3/2/2017   • Lyme disease    • Multinodular goiter     Subcentimer thyroid nodules bilateral on u/s 2008 09,10   • Onychomycosis     Last assessed 01/19/15   • Rhinitis medicamentosa     Last assessed 07/24/13   • Vertigo     Last assessed 12/06/13     Past Surgical History:   Procedure Laterality Date   • COLONOSCOPY  08/2011    Diverticulious Dr. Renata Rey   • FL INJECTION LEFT SHOULDER (ARTHROGRAM)  6/15/2020   • OTHER SURGICAL HISTORY Right     Fixation of fracture of one metacarpal bone - 5th ([facility])   • SEPTOPLASTY  12/2013     Social History   Social History     Substance and Sexual Activity   Alcohol Use Yes    Comment: social     Social History     Substance and Sexual Activity   Drug Use No     Social History     Tobacco Use   Smoking Status Former   • Packs/day: 1.00   • Years: 9.00   • Total pack years: 9.00   • Types: Cigarettes   • Quit date: 2008   • Years since quitting: 15.7   Smokeless Tobacco Former     Family History   Problem Relation Age of Onset   • No Known Problems Mother    • No Known Problems Father        Meds/Allergies     (Not in a hospital admission)      Allergies   Allergen Reactions   • Penicillins Rash       Objective     /79   Pulse (!) 54   Temp (!) 96.3 °F (35.7 °C) (Tympanic)   Resp 18   Ht 6' 1" (1.854 m)   Wt 91.2 kg (201 lb)   SpO2 97%   BMI 26.52 kg/m²       PHYSICAL EXAM    Gen: NAD  CV: RRR  CHEST: Clear  ABD: soft, NT/ND  EXT: no edema  Neuro: AAO      ASSESSMENT/PLAN:  This is a 57y.o. year old male here for screening Patient on virtual 1:1 via ipad        Marya Becerra RN  01/04/22 1922 colonoscopy.        PLAN:   Procedure: colonoscopy

## 2023-10-05 NOTE — ANESTHESIA POSTPROCEDURE EVALUATION
Post-Op Assessment Note    CV Status:  Stable  Pain Score: 0    Pain management: adequate     Mental Status:  Awake   Hydration Status:  Stable   PONV Controlled:  None   Airway Patency:  Patent      Post Op Vitals Reviewed: Yes      Staff: CRNA   Comments: spontaneously breathing, moving air, simple mask to O2, vss, fully endorsed to recovery w/o AC        No notable events documented.     BP   119/65   Temp     Pulse 72   Resp   14   SpO2   99

## 2023-10-10 PROCEDURE — 88305 TISSUE EXAM BY PATHOLOGIST: CPT | Performed by: PATHOLOGY

## 2023-10-20 DIAGNOSIS — I10 ESSENTIAL HYPERTENSION: ICD-10-CM

## 2023-10-20 RX ORDER — LISINOPRIL 10 MG/1
10 TABLET ORAL DAILY
Qty: 90 TABLET | Refills: 0 | Status: SHIPPED | OUTPATIENT
Start: 2023-10-20

## 2023-11-01 ENCOUNTER — TELEPHONE (OUTPATIENT)
Dept: GASTROENTEROLOGY | Facility: CLINIC | Age: 48
End: 2023-11-01

## 2023-11-01 NOTE — TELEPHONE ENCOUNTER
----- Message from Juana Nettles sent at 10/11/2023  8:48 AM EDT -----    ----- Message -----  From: Concepción Mcdonough MD  Sent: 10/10/2023   1:52 PM EDT  To: [facility]    The polyp that was removed was a tubular adenoma. This is a precancerous polyp but has been completely removed. There is no high-grade dysplasia and no cancer. As we discussed, repeat colonoscopy in 5 years.     Please call with any questions or concerns

## 2023-11-01 NOTE — TELEPHONE ENCOUNTER
lmom asking patient to contact the office for results and recommendations  Colon recall placed   Letter sent

## 2024-01-17 DIAGNOSIS — I10 ESSENTIAL HYPERTENSION: ICD-10-CM

## 2024-01-17 RX ORDER — LISINOPRIL 10 MG/1
10 TABLET ORAL DAILY
Qty: 90 TABLET | Refills: 1 | Status: SHIPPED | OUTPATIENT
Start: 2024-01-17

## 2024-04-18 DIAGNOSIS — I10 ESSENTIAL HYPERTENSION: ICD-10-CM

## 2024-04-18 RX ORDER — LISINOPRIL 10 MG/1
10 TABLET ORAL DAILY
Qty: 90 TABLET | Refills: 0 | Status: SHIPPED | OUTPATIENT
Start: 2024-04-18

## 2024-06-06 ENCOUNTER — RA CDI HCC (OUTPATIENT)
Dept: OTHER | Facility: HOSPITAL | Age: 49
End: 2024-06-06

## 2024-06-06 NOTE — PROGRESS NOTES
HCC coding opportunities       Chart reviewed, no opportunity found: CHART REVIEWED, NO OPPORTUNITY FOUND        Patients Insurance        Commercial Insurance: [employer]

## 2024-06-13 ENCOUNTER — OFFICE VISIT (OUTPATIENT)
Age: 49
End: 2024-06-13
Payer: COMMERCIAL

## 2024-06-13 VITALS
OXYGEN SATURATION: 97 % | DIASTOLIC BLOOD PRESSURE: 74 MMHG | RESPIRATION RATE: 18 BRPM | HEART RATE: 56 BPM | WEIGHT: 199.4 LBS | TEMPERATURE: 97.5 F | SYSTOLIC BLOOD PRESSURE: 120 MMHG | HEIGHT: 73 IN | BODY MASS INDEX: 26.43 KG/M2

## 2024-06-13 DIAGNOSIS — R73.01 IMPAIRED FASTING GLUCOSE: ICD-10-CM

## 2024-06-13 DIAGNOSIS — R53.83 FATIGUE, UNSPECIFIED TYPE: ICD-10-CM

## 2024-06-13 DIAGNOSIS — Z00.00 ANNUAL PHYSICAL EXAM: Primary | ICD-10-CM

## 2024-06-13 DIAGNOSIS — I10 BENIGN ESSENTIAL HYPERTENSION: ICD-10-CM

## 2024-06-13 PROCEDURE — 99396 PREV VISIT EST AGE 40-64: CPT | Performed by: FAMILY MEDICINE

## 2024-06-13 NOTE — PROGRESS NOTES
Adult Annual Physical  Name: Michael Pérez      : 1975      MRN: [id number]  Encounter Provider: Clare Barrera DO  Encounter Date: 2024   Encounter department: [facility]    Assessment & Plan   1. Annual physical exam  2. Benign essential hypertension  -     Lipid panel; Future; Expected date: 2024  -     Comprehensive metabolic panel; Future; Expected date: 2024  -     TSH, 3rd generation with Free T4 reflex; Future; Expected date: 2024  3. Impaired fasting glucose  -     Hemoglobin A1C; Future; Expected date: 2024  -     Comprehensive metabolic panel; Future; Expected date: 2024  4. Fatigue, unspecified type  -     Comprehensive metabolic panel; Future; Expected date: 2024  -     TSH, 3rd generation with Free T4 reflex; Future; Expected date: 2024  -     Vitamin D 25 hydroxy; Future; Expected date: 2024  -     Lyme disease, PCR; Future  5. BMI 26.0-26.9,adult  -     Lipid panel; Future; Expected date: 2024  -     Hemoglobin A1C; Future; Expected date: 2024  -     Comprehensive metabolic panel; Future; Expected date: 2024  -     TSH, 3rd generation with Free T4 reflex; Future; Expected date: 2024  -     Vitamin D 25 hydroxy; Future; Expected date: 2024  Immunizations and preventive care screenings were discussed with patient today. Appropriate education was printed on patient's after visit summary.      Counseling:  Alcohol/drug use: discussed moderation in alcohol intake, the recommendations for healthy alcohol use, and avoidance of illicit drug use.  Dental Health: discussed importance of regular tooth brushing, flossing, and dental visits.  Injury prevention: discussed safety/seat belts, safety helmets, smoke detectors, carbon dioxide detectors, and smoking near bedding or upholstery.  Sexual health: discussed sexually transmitted diseases, partner selection, use of condoms, avoidance of  unintended pregnancy, and contraceptive alternatives.  Exercise: the importance of regular exercise/physical activity was discussed. Recommend exercise 3-5 times per week for at least 30 minutes.       Depression Screening and Follow-up Plan: Patient was screened for depression during today's encounter. They screened negative with a PHQ-2 score of 0.      Return in about 1 year (around 6/13/2025) for Annual physical.    The patient indicates understanding of these issues and agrees with the plan.            History of Present Illness     Adult Annual Physical:  Patient presents for annual physical.     Diet and Physical Activity:  - Diet/Nutrition: well balanced diet, limited junk food and consuming 3-5 servings of fruits/vegetables daily.  - Exercise: moderate cardiovascular exercise, strength training exercises, 5-7 times a week on average and 30-60 minutes on average.    Depression Screening:  - PHQ-2 Score: 0    General Health:  - Sleep: sleeps well and 7-8 hours of sleep on average.  - Hearing: normal hearing bilateral ears.  - Vision: goes for regular eye exams.  - Dental: regular dental visits.     Health:  - History of STDs: no.   - Urinary symptoms: none.     Advanced Care Planning:  - Has an advanced directive?: yes    - Has a durable medical POA?: yes    - ACP document given to patient?: no        Review of Systems   Constitutional:  Positive for fatigue. Negative for appetite change, fever and unexpected weight change.   HENT:  Negative for congestion, dental problem, ear pain, postnasal drip, sore throat and tinnitus.    Eyes:  Negative for pain, discharge and visual disturbance.   Respiratory:  Negative for cough, shortness of breath and wheezing.    Cardiovascular:  Negative for chest pain, palpitations and leg swelling.   Gastrointestinal:  Negative for abdominal pain, constipation, diarrhea, nausea and vomiting.   Endocrine: Negative for cold intolerance and heat intolerance.   Genitourinary:   "Negative for difficulty urinating, dysuria, flank pain and urgency.   Musculoskeletal:  Negative for arthralgias, back pain, joint swelling and myalgias.   Skin:  Negative for rash and wound.   Allergic/Immunologic: Negative for immunocompromised state.   Neurological:  Negative for dizziness, syncope, speech difficulty, weakness and numbness.   Hematological:  Negative for adenopathy. Does not bruise/bleed easily.   Psychiatric/Behavioral:  Negative for confusion, dysphoric mood and sleep disturbance. The patient is not nervous/anxious.      Medical History Reviewed by provider this encounter:  Tobacco  Allergies  Meds  Problems  Med Hx  Surg Hx  Fam Hx         Objective     /74   Pulse 56   Temp 97.5 °F (36.4 °C)   Resp 18   Ht 6' 1\" (1.854 m)   Wt 90.4 kg (199 lb 6.4 oz)   SpO2 97%   BMI 26.31 kg/m²     Physical Exam  Vitals and nursing note reviewed.   Constitutional:       General: He is not in acute distress.     Appearance: Normal appearance. He is well-developed.      Comments: Body mass index is 26.31 kg/m².    HENT:      Head: Normocephalic and atraumatic.      Right Ear: Hearing, tympanic membrane, ear canal and external ear normal.      Left Ear: Hearing, tympanic membrane, ear canal and external ear normal.      Nose: Nose normal.      Mouth/Throat:      Mouth: Mucous membranes are moist.      Pharynx: Uvula midline. No oropharyngeal exudate.   Eyes:      General: No scleral icterus.     Conjunctiva/sclera: Conjunctivae normal.      Pupils: Pupils are equal, round, and reactive to light.   Neck:      Thyroid: No thyromegaly.   Cardiovascular:      Rate and Rhythm: Normal rate and regular rhythm.      Heart sounds: Normal heart sounds. No murmur heard.  Pulmonary:      Effort: Pulmonary effort is normal. No respiratory distress.      Breath sounds: Normal breath sounds. No wheezing or rales.   Abdominal:      General: Bowel sounds are normal.      Palpations: Abdomen is soft. There is " no mass.      Tenderness: There is no abdominal tenderness.   Musculoskeletal:         General: No tenderness. Normal range of motion.      Cervical back: Normal range of motion and neck supple.      Right lower leg: No edema.      Left lower leg: No edema.   Lymphadenopathy:      Cervical: No cervical adenopathy.   Skin:     General: Skin is warm and dry.      Coloration: Skin is not jaundiced.      Findings: No erythema or rash.   Neurological:      General: No focal deficit present.      Mental Status: He is alert and oriented to person, place, and time.      Cranial Nerves: No cranial nerve deficit.      Deep Tendon Reflexes: Reflexes are normal and symmetric.   Psychiatric:         Mood and Affect: Mood normal.         Behavior: Behavior normal.

## 2024-06-27 LAB — B BURGDOR AB SER IA-ACNC: <0.9 INDEX

## 2024-06-27 NOTE — RESULT ENCOUNTER NOTE
Francisco,    Your Lyme screen is negative.  Please don't hesitate to reach out if you still have some concern.    Take care,  Dr Barrera

## 2024-07-23 DIAGNOSIS — I10 ESSENTIAL HYPERTENSION: ICD-10-CM

## 2024-07-23 RX ORDER — LISINOPRIL 10 MG/1
10 TABLET ORAL DAILY
Qty: 30 TABLET | Refills: 0 | Status: SHIPPED | OUTPATIENT
Start: 2024-07-23

## 2024-07-26 LAB
25(OH)D3 SERPL-MCNC: 36 NG/ML (ref 30–100)
ALBUMIN SERPL-MCNC: 4.6 G/DL (ref 3.6–5.1)
ALBUMIN/GLOB SERPL: 2.2 (CALC) (ref 1–2.5)
ALP SERPL-CCNC: 61 U/L (ref 36–130)
ALT SERPL-CCNC: 30 U/L (ref 9–46)
AST SERPL-CCNC: 25 U/L (ref 10–40)
BILIRUB SERPL-MCNC: 0.8 MG/DL (ref 0.2–1.2)
BUN SERPL-MCNC: 22 MG/DL (ref 7–25)
BUN/CREAT SERPL: NORMAL (CALC) (ref 6–22)
CALCIUM SERPL-MCNC: 9.8 MG/DL (ref 8.6–10.3)
CHLORIDE SERPL-SCNC: 103 MMOL/L (ref 98–110)
CO2 SERPL-SCNC: 27 MMOL/L (ref 20–32)
CREAT SERPL-MCNC: 1.04 MG/DL (ref 0.6–1.29)
GFR/BSA.PRED SERPLBLD CYS-BASED-ARV: 90 ML/MIN/1.73M2
GLOBULIN SER CALC-MCNC: 2.1 G/DL (CALC) (ref 1.9–3.7)
GLUCOSE SERPL-MCNC: 94 MG/DL (ref 65–99)
HBA1C MFR BLD: 5.7 % OF TOTAL HGB
POTASSIUM SERPL-SCNC: 4.6 MMOL/L (ref 3.5–5.3)
PROT SERPL-MCNC: 6.7 G/DL (ref 6.1–8.1)
SODIUM SERPL-SCNC: 138 MMOL/L (ref 135–146)
TSH SERPL-ACNC: 2.15 MIU/L (ref 0.4–4.5)

## 2024-08-25 DIAGNOSIS — I10 ESSENTIAL HYPERTENSION: ICD-10-CM

## 2024-08-26 RX ORDER — LISINOPRIL 10 MG/1
10 TABLET ORAL DAILY
Qty: 90 TABLET | Refills: 0 | Status: SHIPPED | OUTPATIENT
Start: 2024-08-26

## 2024-12-02 DIAGNOSIS — I10 ESSENTIAL HYPERTENSION: ICD-10-CM

## 2024-12-03 RX ORDER — LISINOPRIL 10 MG/1
10 TABLET ORAL DAILY
Qty: 90 TABLET | Refills: 1 | Status: SHIPPED | OUTPATIENT
Start: 2024-12-03

## 2025-02-18 ENCOUNTER — TELEPHONE (OUTPATIENT)
Age: 50
End: 2025-02-18

## 2025-02-18 DIAGNOSIS — I10 ESSENTIAL HYPERTENSION: ICD-10-CM

## 2025-02-19 DIAGNOSIS — I10 ESSENTIAL HYPERTENSION: ICD-10-CM

## 2025-02-19 RX ORDER — LISINOPRIL 10 MG/1
10 TABLET ORAL DAILY
Qty: 90 TABLET | Refills: 0 | OUTPATIENT
Start: 2025-02-19

## 2025-02-20 RX ORDER — LISINOPRIL 10 MG/1
10 TABLET ORAL DAILY
Qty: 90 TABLET | Refills: 1 | Status: SHIPPED | OUTPATIENT
Start: 2025-02-20

## 2025-03-24 ENCOUNTER — TELEPHONE (OUTPATIENT)
Age: 50
End: 2025-03-24

## 2025-03-24 DIAGNOSIS — R63.8 UNABLE TO LOSE WEIGHT: Primary | ICD-10-CM

## 2025-03-24 NOTE — TELEPHONE ENCOUNTER
If cardiology is recommending cholesterol medication then I agree with them (however I don't see the labs).    We do not rx weight loss medication but I would be happy to enter a referral to Medical Weight Management.  Please let me know.

## 2025-03-24 NOTE — TELEPHONE ENCOUNTER
Patient called regarding blood work results that were ordered by his [facility] cardiologist. It has been learned that is cholesterol is elevated and his cardiologist is recommending cholesterol medication. Patient is agreeable but would like his PCP to weigh in. Patient would also like to try a weight loss medication at the same time.    Please advise.

## 2025-03-25 NOTE — TELEPHONE ENCOUNTER
Patient notified of message and he would like a referral for the Weight management.    Also, I saw his labs are under care everywhere done on 3/19/2025 regarding his cholesterol

## 2025-05-12 ENCOUNTER — CLINICAL SUPPORT (OUTPATIENT)
Dept: BARIATRICS | Facility: CLINIC | Age: 50
End: 2025-05-12
Payer: COMMERCIAL

## 2025-05-12 VITALS — HEIGHT: 73 IN | WEIGHT: 202.6 LBS | BODY MASS INDEX: 26.85 KG/M2

## 2025-05-12 DIAGNOSIS — R63.8 UNABLE TO LOSE WEIGHT: ICD-10-CM

## 2025-05-12 DIAGNOSIS — R63.5 ABNORMAL WEIGHT GAIN: Primary | ICD-10-CM

## 2025-05-12 PROCEDURE — WMDI30

## 2025-05-12 PROCEDURE — RECHECK

## 2025-05-12 NOTE — PROGRESS NOTES
"Weight Management Medical Nutrition Assessment  Pt is here today for menu planning. Current weight 202.6#. Was at about 220# and has been working on losing, but continues to remain at about 200# lately.  Pt reports that he does exercise and find he is hungrier on workout days, but then tends to over eat.  Reviewed diet recall and pt appears to have a good meal/snack schedule, but his morning meals/snacks appear higher in carbs and lower in protein.  Explained the importance of protein and encouraged protein at each meal and snack. Did some menu planning coming up with some protein based meal ideas.  Pt felt this was beneficial and will try. Pt will FU PRN.      Patient seen by Medical Provider in past 6 months:  no  Requested to schedule appointment with Medical Provider: No    Anthropometric Measurements  Provider Start Weight (#): 202.6# (25)  Current Weight (#): 202.6#  TBW % Change from start weight: -%  IBW: 181# (72.5\")  Goal Weight (#):LT  Highest Weight (#): 220#  Lowest Weight (#): 173#    Weight Loss History  Previous weight loss attempts: Exercise  FAD Diets (Cabbage soup, Grapefruit, Cleanse, etc.)  Self Created Diets (Portion Control, Healthy Food Choices, etc.)    Food and Nutrition Related History  Wake up: 4-4:30am   Bed Time: 8-9pm  Sleep quality: good    Dietary Recall  Breakfast: 6am-apple, coffee, water fruit pie ([employer])   Snack: 10am-cereal (raisin bran) w/skim milk   Lunch: 11:30am-12pm: 2-3 times per week out--grilled chicken wrap with ff or protein bar (One)  Snack: ride home--water, coffee, protein shake (1 scoop (BUM in water), occ snack on ride home--gummy snack, fruit or NV bar --varies  Dinner: 5pm- burger or eggs or chicken--meat/starch/veggies  Snack: not usually     Volume of beverage intake: 90-120oz water, coffee (4 cups w/skim milk or half and half)    Weekends: more snacking/eating out  Cravings: varies  Trouble area of day: ride home    Frequency of Eating out: 5 " meals per week--lunch (wrap w/french fries) or dinner  Food restrictions: none  Lives with wife + son (18)  Cooking: both  Food Shopping: wife    Occupation: Runs water  utility in [place]     Physical Activity  Activity: 150 mins per week:  80% of the activity is pelaton classes + 20% is strength--3-4 times per week--usually workouts after dinner  Frequency: 10,000 steps every day  Physical limitations/barriers to exercise: -    Estimated Needs  [employer] Energy Needs (needs at 202#)  BMR: 1837 kcal  Maintenance calories (sedentary): 2205 kcal  1-2#/week loss (sedentary): 3083-2127 kcal  1-2#/week loss (activity 1-3x/week): 6740-1194 kcal    Protein:  grams (1.2-1.5g/kg IBW)  Total Fluid: 99.16 ounces (Hector-Segar Method) [#]  Free Fluid: 79 ounces (Hector-Segar Method - 20%)    Nutrition Diagnosis  Yes;    Overweight/obesity  related to Excess energy intake as evidenced by  BMI more than normative standard for age and sex (overweight 25-29.9)       Nutrition Intervention    Nutrition Prescription  Calories: 1800 kcal  Protein: 100 g  Fluid: 80 ounces    Meal Plan (Malik/Pro)  Breakfast: 300/20g  Snack: 150-200/12-20g  Lunch: 500/44g  Snack: 150-200/10-25g  Dinner: 600/42g  Snack: --    Nutrition Education  Calorie controlled menu  Lean protein food choices  Healthy snack options  Appropriate portions  Appropriate meal spacing    Nutrition Counseling  Strategies: motivational interviewing, goal setting, personalized meal planning    Monitoring and Evaluation:    Evaluation criteria  Energy and protein intake  Fluid intake  Monitor weekly weight  Meal planning/preparation  Calorie tracking/Measuring  Portion control   Physical activity     Barriers to change:none  Readiness to change: Preparation:  (Getting ready to change)   Comprehension: good  Expected Compliance: good

## 2025-08-04 ENCOUNTER — OFFICE VISIT (OUTPATIENT)
Age: 50
End: 2025-08-04
Payer: COMMERCIAL

## 2025-08-04 VITALS
HEART RATE: 57 BPM | BODY MASS INDEX: 26.37 KG/M2 | HEIGHT: 73 IN | SYSTOLIC BLOOD PRESSURE: 120 MMHG | TEMPERATURE: 98.2 F | DIASTOLIC BLOOD PRESSURE: 72 MMHG | OXYGEN SATURATION: 97 % | WEIGHT: 199 LBS

## 2025-08-04 DIAGNOSIS — Z00.00 ANNUAL PHYSICAL EXAM: Primary | ICD-10-CM

## 2025-08-04 DIAGNOSIS — R73.01 IMPAIRED FASTING GLUCOSE: ICD-10-CM

## 2025-08-04 DIAGNOSIS — I10 BENIGN ESSENTIAL HYPERTENSION: ICD-10-CM

## 2025-08-04 PROCEDURE — 99396 PREV VISIT EST AGE 40-64: CPT | Performed by: FAMILY MEDICINE

## 2025-08-04 RX ORDER — ROSUVASTATIN CALCIUM 10 MG/1
TABLET, COATED ORAL
COMMUNITY
Start: 2025-04-01

## 2025-08-04 RX ORDER — FLUOROURACIL 50 MG/ML
SOLUTION TOPICAL
COMMUNITY
Start: 2025-07-25

## 2025-08-11 LAB
ALBUMIN SERPL-MCNC: 4.9 G/DL (ref 3.6–5.1)
ALBUMIN/GLOB SERPL: 2.3 (CALC) (ref 1–2.5)
ALP SERPL-CCNC: 65 U/L (ref 36–130)
ALT SERPL-CCNC: 44 U/L (ref 9–46)
AST SERPL-CCNC: 39 U/L (ref 10–40)
BILIRUB SERPL-MCNC: 0.9 MG/DL (ref 0.2–1.2)
BUN SERPL-MCNC: 18 MG/DL (ref 7–25)
BUN/CREAT SERPL: ABNORMAL (CALC) (ref 6–22)
CALCIUM SERPL-MCNC: 10.1 MG/DL (ref 8.6–10.3)
CHLORIDE SERPL-SCNC: 102 MMOL/L (ref 98–110)
CO2 SERPL-SCNC: 31 MMOL/L (ref 20–32)
CREAT SERPL-MCNC: 1.04 MG/DL (ref 0.6–1.29)
GFR/BSA.PRED SERPLBLD CYS-BASED-ARV: 89 ML/MIN/1.73M2
GLOBULIN SER CALC-MCNC: 2.1 G/DL (CALC) (ref 1.9–3.7)
GLUCOSE SERPL-MCNC: 105 MG/DL (ref 65–99)
POTASSIUM SERPL-SCNC: 5.5 MMOL/L (ref 3.5–5.3)
PROT SERPL-MCNC: 7 G/DL (ref 6.1–8.1)
SODIUM SERPL-SCNC: 139 MMOL/L (ref 135–146)